# Patient Record
Sex: MALE | Race: WHITE | NOT HISPANIC OR LATINO | Employment: FULL TIME | URBAN - METROPOLITAN AREA
[De-identification: names, ages, dates, MRNs, and addresses within clinical notes are randomized per-mention and may not be internally consistent; named-entity substitution may affect disease eponyms.]

---

## 2017-02-23 ENCOUNTER — LAB (OUTPATIENT)
Dept: LAB | Facility: HOSPITAL | Age: 57
End: 2017-02-23

## 2017-02-23 DIAGNOSIS — D47.2 MONOCLONAL GAMMOPATHIES: ICD-10-CM

## 2017-02-23 DIAGNOSIS — M42.00 SCHEUERMANN DISEASE: ICD-10-CM

## 2017-02-23 LAB
ALBUMIN SERPL-MCNC: 4.1 G/DL (ref 3.5–5.2)
ALBUMIN/GLOB SERPL: 1.3 G/DL (ref 1.1–2.4)
ALP SERPL-CCNC: 38 U/L (ref 38–116)
ALT SERPL W P-5'-P-CCNC: 14 U/L (ref 0–41)
ANION GAP SERPL CALCULATED.3IONS-SCNC: 10.8 MMOL/L
AST SERPL-CCNC: 19 U/L (ref 0–40)
BASOPHILS # BLD AUTO: 0.06 10*3/MM3 (ref 0–0.1)
BASOPHILS NFR BLD AUTO: 1.2 % (ref 0–1.1)
BILIRUB SERPL-MCNC: 0.3 MG/DL (ref 0.1–1.2)
BUN BLD-MCNC: 11 MG/DL (ref 6–20)
BUN/CREAT SERPL: 12.1 (ref 7.3–30)
CALCIUM SPEC-SCNC: 10 MG/DL (ref 8.5–10.2)
CHLORIDE SERPL-SCNC: 101 MMOL/L (ref 98–107)
CO2 SERPL-SCNC: 26.2 MMOL/L (ref 22–29)
CREAT BLD-MCNC: 0.91 MG/DL (ref 0.7–1.3)
DEPRECATED RDW RBC AUTO: 44.9 FL (ref 37–49)
EOSINOPHIL # BLD AUTO: 0.65 10*3/MM3 (ref 0–0.36)
EOSINOPHIL NFR BLD AUTO: 13.1 % (ref 1–5)
ERYTHROCYTE [DISTWIDTH] IN BLOOD BY AUTOMATED COUNT: 13.2 % (ref 11.7–14.5)
GFR SERPL CREATININE-BSD FRML MDRD: 86 ML/MIN/1.73
GLOBULIN UR ELPH-MCNC: 3.1 GM/DL (ref 1.8–3.5)
GLUCOSE BLD-MCNC: 112 MG/DL (ref 74–124)
HCT VFR BLD AUTO: 38.9 % (ref 40–49)
HGB BLD-MCNC: 13 G/DL (ref 13.5–16.5)
IMM GRANULOCYTES # BLD: 0.01 10*3/MM3 (ref 0–0.03)
IMM GRANULOCYTES NFR BLD: 0.2 % (ref 0–0.5)
LYMPHOCYTES # BLD AUTO: 1.28 10*3/MM3 (ref 1–3.5)
LYMPHOCYTES NFR BLD AUTO: 25.9 % (ref 20–49)
MCH RBC QN AUTO: 31.1 PG (ref 27–33)
MCHC RBC AUTO-ENTMCNC: 33.4 G/DL (ref 32–35)
MCV RBC AUTO: 93.1 FL (ref 83–97)
MONOCYTES # BLD AUTO: 0.42 10*3/MM3 (ref 0.25–0.8)
MONOCYTES NFR BLD AUTO: 8.5 % (ref 4–12)
NEUTROPHILS # BLD AUTO: 2.53 10*3/MM3 (ref 1.5–7)
NEUTROPHILS NFR BLD AUTO: 51.1 % (ref 39–75)
NRBC BLD MANUAL-RTO: 0 /100 WBC (ref 0–0)
PLATELET # BLD AUTO: 301 10*3/MM3 (ref 150–375)
PMV BLD AUTO: 8.9 FL (ref 8.9–12.1)
POTASSIUM BLD-SCNC: 4 MMOL/L (ref 3.5–4.7)
PROT SERPL-MCNC: 7.2 G/DL (ref 6.3–8)
RBC # BLD AUTO: 4.18 10*6/MM3 (ref 4.3–5.5)
SODIUM BLD-SCNC: 138 MMOL/L (ref 134–145)
WBC NRBC COR # BLD: 4.95 10*3/MM3 (ref 4–10)

## 2017-02-23 PROCEDURE — 36415 COLL VENOUS BLD VENIPUNCTURE: CPT | Performed by: INTERNAL MEDICINE

## 2017-02-23 PROCEDURE — 85025 COMPLETE CBC W/AUTO DIFF WBC: CPT | Performed by: INTERNAL MEDICINE

## 2017-02-23 PROCEDURE — 80053 COMPREHEN METABOLIC PANEL: CPT | Performed by: INTERNAL MEDICINE

## 2017-02-24 LAB
ALBUMIN SERPL-MCNC: 3.7 G/DL (ref 2.9–4.4)
ALBUMIN/GLOB SERPL: 1.1 {RATIO} (ref 0.7–1.7)
ALPHA1 GLOB FLD ELPH-MCNC: 0.2 G/DL (ref 0–0.4)
ALPHA2 GLOB SERPL ELPH-MCNC: 0.7 G/DL (ref 0.4–1)
B-GLOBULIN SERPL ELPH-MCNC: 2.1 G/DL (ref 0.7–1.3)
GAMMA GLOB SERPL ELPH-MCNC: 0.5 G/DL (ref 0.4–1.8)
GLOBULIN SER CALC-MCNC: 3.5 G/DL (ref 2.2–3.9)
IGA SERPL-MCNC: 80 MG/DL (ref 90–386)
IGG SERPL-MCNC: 1600 MG/DL (ref 700–1600)
IGM SERPL-MCNC: 38 MG/DL (ref 20–172)
INTERPRETATION SERPL IEP-IMP: ABNORMAL
KAPPA LC SERPL-MCNC: 13.36 MG/L (ref 3.3–19.4)
KAPPA LC/LAMBDA SER: 1.54 {RATIO} (ref 0.26–1.65)
LAMBDA LC FREE SERPL-MCNC: 8.7 MG/L (ref 5.71–26.3)
Lab: ABNORMAL
M-SPIKE: ABNORMAL G/DL
PROT SERPL-MCNC: 7.2 G/DL (ref 6–8.5)

## 2017-03-03 ENCOUNTER — APPOINTMENT (OUTPATIENT)
Dept: LAB | Facility: HOSPITAL | Age: 57
End: 2017-03-03

## 2017-03-03 ENCOUNTER — OFFICE VISIT (OUTPATIENT)
Dept: ONCOLOGY | Facility: CLINIC | Age: 57
End: 2017-03-03

## 2017-03-03 VITALS
TEMPERATURE: 98.9 F | BODY MASS INDEX: 24.52 KG/M2 | SYSTOLIC BLOOD PRESSURE: 110 MMHG | DIASTOLIC BLOOD PRESSURE: 76 MMHG | HEIGHT: 73 IN | HEART RATE: 60 BPM | WEIGHT: 185 LBS | RESPIRATION RATE: 16 BRPM | OXYGEN SATURATION: 99 %

## 2017-03-03 DIAGNOSIS — M42.00 SCHEUERMANN DISEASE: ICD-10-CM

## 2017-03-03 DIAGNOSIS — E83.52 HYPERCALCEMIA: ICD-10-CM

## 2017-03-03 DIAGNOSIS — E21.3 HYPERPARATHYROIDISM (HCC): Primary | ICD-10-CM

## 2017-03-03 DIAGNOSIS — D47.2 MONOCLONAL GAMMOPATHIES: ICD-10-CM

## 2017-03-03 PROCEDURE — G0463 HOSPITAL OUTPT CLINIC VISIT: HCPCS | Performed by: INTERNAL MEDICINE

## 2017-03-03 PROCEDURE — 99213 OFFICE O/P EST LOW 20 MIN: CPT | Performed by: INTERNAL MEDICINE

## 2017-03-03 NOTE — PROGRESS NOTES
Subjective    REASONS FOR FOLLOWUP:   1. Monoclonal gammopathy of undetermined significance with normal free light ratio.   2. Hypercalcemia related to hyperparathyroidism.   3. Multiple compression fractures at thoracic spine. Scheuermann's disease?   4. Iron deficiency due to frequent blood donations. Negative gastrointestinal workup.   5. Increased plasma cells in the marrow 5 to 10%.   6. Increase peripheral neuropathy (?) B12 deficiency.   7. Abdominal fat pad biopsy done in [place], negative for amyloid.         History of Present Illness  patient is a 56-year-old male with a monoclonal gammopathy of undetermined significance along with primary hyperparathyroidism and Scheuermann's disease which complicated the picture significantly.  We have been watching his paraprotein over the last 2 years and has been a slight upward trend but he remains asymptomatic.  He has no bone pain and his neuropathy is almost completely resolved and he feels good and exercises daily.  His paraprotein today is stable from July and   He continues with  chronic low back pain which he has had for many years but no signs of sciatica which is surprising considering the amount of degenerative disease he has in his lumbar spine.    PAST MEDICAL HISTORY: Significant only for asthma which he has had since childhood and for which he is on a steroid inhaler for the last 12 years. He has a history of jaundice at birth treated with an exchange transfusion possibly from an Rh incompatibility. He has a history in his right big toe and history of degenerative changes in the thoracic spine with wedge deformities on two mid thoracic vertebral bodies noted on a routine chest x-ray in 2005  .   SURGICAL HISTORY: Colonoscopies, EGD with dilatation of an esophageal stricture in 2009 and sinus surgery in 2005.     ONCOLOGIC/HEMATOLOGIC HISTORY: History from previous dates can be found in the separate document.   The patient was noted on blood work  as far back as 2005 to have mildly elevated calcium in the mid-10 range. The patient's calcium elevation persisted and this year went as high as 11 and this prompted further evaluation with an endocrinology visit. Dr. Aliyah Luevano is evaluating for a hyperparathyroidism but in the course of her workup ordered a serum protein electrophoresis which showed a biclonal gammopathy with two separate paraproteins one being 1 gm, the other being . 2 gm and immunofixation was recommended. The patient was then referred to us for evaluation. He denies bone pains or weight loss or other complaints but has been mildly anemic over the last six months without any obvious cause. Blood work in Dr. Rocael juarez's office did show an elevated serum iron of 177, mild elevation of his glucose of uncertain significance. He has had no fever, sweats or weight loss or hematuria.   Lab data done in our office on 09/19/2013 shows sedimentation rate of 4, normal MMA, se rum iron 154, TIBC 435, and ferritin of 15. Normal B12 and folate. Beta 2 microglobulin normal. Calcium 10.8, SPEP confirms 1.2. Paraprotein 1 is 0.2 and other is 1.2 gm; both of them apparently IgG kappa. Serum free light chains normal.   Bone density shows mild osteopenia in spine and femoral neck. Ultrasound of kidneys is unremarkable. MRI of thoracic spine shows multiple Schmorl' s nodes indenting end plates of T5 to T11 with some anterior wedging of T7-8, T8-9. This was felt to be a form of Scheuermann's disease involving thoracic spine. No signs or symptoms of myelomatous or neoplastic involvement.   Bone marrow aspirate and biopsy done to exclude a plasma cell dyscrasia although hypercalcemia appears to be related to hyperparathyroidism and bone lesions may be incidental also.   Bone marrow biopsy shows 5 to 8% plasma cells. There were not enough plasma cells to do FISH test. Cytogenetics was normal. Musculoskeletal survey showed no lytic lesions in the skull or long bones.  Urine s roland showed no monoclonal protein in the urine in light of the normal free light chain ratio and at this point I cannot make a diagnosis for multiple myeloma. I plan to follow closely and do an MRI of the cervical and lumbar spine to make sure there are n o bony lesions detectable there. Discussed the fact that he may be a smoldering myeloma, rather than a true MGUS, but we will have to watch for the time being.   The patient was seen on 01/20/2014 with MRIs of the C-spine, lumbar spine and pelvis, all of wh ich showed degenerative disease and no lesions suspicious for myeloma. Pathology report on his parathyroidectomy shows 4 normal-appearing parathyroid with hypoplasia, and no tumor. Repeat calcium levels are pending. Urine protein studies show no monoclona l protein or proteinuria, and at this point he appears to have MGUS with mild increase in marrow plasmacytosis, and no evidence of bony involvement, and the hypercalcemia is from hyperparathyroidism, and we need to follow him closely. He still may be movin g to the [place] area and does not know for sure.   The patient was seen on 05/13/2014 with stable M protein and negative urine paraprotein. He has fairly recent onset of neuropathy in his legs, left more than right noted. We will check the B12 level and give him B12 injections empirically until the results are back and send him to a neurologist for evaluation. He may need a fat pad biopsy if there is no improvement of B12 to make sure he does not have amyloid neuropathy.   The patient was seen in [place] in July 2014, fat pad biopsy was negative for amyloid. Neuropathy better off the B6 and B12. MGUS stable. Followup continued with plans to repeat the skeletal survey and thoracic MRI annually.   Patient seen 01/09/2015 with leukopenia, possibly relate d to a viral infection which is resolving, complaining of neck pain. Cervical MRI to be reviewed. Skeletal survey shows no change, with  compression fractures in the mid-thoracic spine and no new lytic lesions. His SPEP shows a stable M protein, but hi s free light chain ratio is abnormal for the first time and needs to be watched closely.   Patient on 2015 with an MRI of the C-spine showing significant degenerative disease with no lesions of myeloma. His paraprotein is stable at 1.0 and his free l ight chain ratio has normalized. Continue followup with 2 month SPEPs and a urine SPEP in 2 months has been scheduled.   MRI of the spine in 10/16 shows no evidence of myeloma is to M proteins are 1.4 and 0.3 g/dl and urine protein shows us very small monoclonal protein-continued follow-up planned in the absence of anemia renal insufficiency or hypercalcemia     SOCIAL HISTORY: He is  and lives by himself. He does not smoke but drinks 50 beers a week and has been doing this for over 20 years. No risk factors for HIV.     FAMILY HISTORY: Father had melanoma at age 60, alive at 72. His mother is healthy. He has two brot hers who are healthy. He has a maternal grandmother with pancreatic cancer in her 50s and maternal grandfather who  of metastatic cancer in the abdomen in his 30s, possibly gastric cancer. Maternal uncle with acute leukemia who  recently. There is no family history of thyroid cancer or other endocrine malignancies or hypercalcemia. He has no children.     Review of Systems   Constitutional: Negative.  Negative for activity change, appetite change, chills, diaphoresis, fatigue and fever.   Musculoskeletal: Positive for back pain.      A comprehensive 14 point review of systems was performed and was negative except as mentioned.      Current Outpatient Prescriptions:   •  ADVAIR DISKUS 250-50 MCG/DOSE DISKUS, , Disp: , Rfl:   •  aspirin 81 MG chewable tablet, Chew 81 mg daily., Disp: , Rfl:   •  mometasone (NASONEX) 50 MCG/ACT nasal spray, 2 sprays into each nostril daily., Disp: , Rfl:       ALLERGIES:  No Known  "Allergies    Objective      Vitals:    03/03/17 0912   BP: 110/76   Pulse: 60   Resp: 16   Temp: 98.9 °F (37.2 °C)   TempSrc: Oral   SpO2: 99%   Weight: 185 lb (83.9 kg)   Height: 73.23\" (186 cm)  Comment: new ht.   PainSc: 0-No pain     Current Status 3/3/2017   ECOG score 0       Physical Exam    GENERAL:  Well-developed, well-nourished in no acute distress.   SKIN:  Warm, dry without rashes, purpura or petechiae.  HEAD:  Normocephalic.  EYES:  Pupils equal, round and reactive to light.  EOMs intact.  Conjunctivae normal.  EARS:  Hearing intact.  NOSE:  Septum midline.  No excoriations or nasal discharge.  MOUTH:  Tongue is well-papillated; no stomatitis or ulcers.  Lips normal.  THROAT:  Oropharynx without lesions or exudates.  NECK:  Supple with good range of motion; no thyromegaly or masses, no JVD.  LYMPHATICS:  No cervical, supraclavicular, axillary or inguinal adenopathy.  CHEST:  Lungs clear to percussion and auscultation. Good airflow.  CARDIAC:  Regular rate and rhythm without murmurs, rubs or gallops. Normal S1,S2.  ABDOMEN:  Soft, nontender with no organomegaly or masses.  EXTREMITIES:  No clubbing, cyanosis or edema.  NEUROLOGICAL:  Cranial Nerves II-XII grossly intact.  No focal neurological deficits.  PSYCHIATRIC:  Normal affect and mood.        RECENT LABS:  Hematology WBC   Date Value Ref Range Status   02/23/2017 4.95 4.00 - 10.00 10*3/mm3 Final     RBC   Date Value Ref Range Status   02/23/2017 4.18 (L) 4.30 - 5.50 10*6/mm3 Final     HEMOGLOBIN   Date Value Ref Range Status   02/23/2017 13.0 (L) 13.5 - 16.5 g/dL Final     HEMATOCRIT   Date Value Ref Range Status   02/23/2017 38.9 (L) 40.0 - 49.0 % Final     PLATELETS   Date Value Ref Range Status   02/23/2017 301 150 - 375 10*3/mm3 Final       M protein #1 -1.2 g   M protein #2 -0.2G in 2/17           Assessment/Plan   1.  MGUS with very slowly increasing paraprotein-no MRI evidence of myeloma in 10/16  2.  Primary hyperparathyroidism post " parathyroidectomy  3.  Scheuermann's disease  4.  Peripheral neuropathy resolved    Plan.  Repeat paraprotein studies in 2 months  See me in 4 months with repeat studies at that time-if his M protein is increasing we will do a skeletal survey and bone marrow                3/3/2017      CC:

## 2017-04-27 ENCOUNTER — LAB (OUTPATIENT)
Dept: LAB | Facility: HOSPITAL | Age: 57
End: 2017-04-27

## 2017-04-27 DIAGNOSIS — E83.52 HYPERCALCEMIA: ICD-10-CM

## 2017-04-27 DIAGNOSIS — D47.2 MONOCLONAL GAMMOPATHIES: ICD-10-CM

## 2017-04-27 DIAGNOSIS — M42.00 SCHEUERMANN DISEASE: ICD-10-CM

## 2017-04-27 DIAGNOSIS — E21.3 HYPERPARATHYROIDISM (HCC): ICD-10-CM

## 2017-04-27 LAB
ALBUMIN SERPL-MCNC: 4.4 G/DL (ref 3.5–5.2)
ALBUMIN/GLOB SERPL: 1.3 G/DL (ref 1.1–2.4)
ALP SERPL-CCNC: 34 U/L (ref 38–116)
ALT SERPL W P-5'-P-CCNC: 19 U/L (ref 0–41)
ANION GAP SERPL CALCULATED.3IONS-SCNC: 13.3 MMOL/L
AST SERPL-CCNC: 26 U/L (ref 0–40)
BASOPHILS # BLD AUTO: 0.06 10*3/MM3 (ref 0–0.1)
BASOPHILS NFR BLD AUTO: 1.3 % (ref 0–1.1)
BILIRUB SERPL-MCNC: 0.3 MG/DL (ref 0.1–1.2)
BUN BLD-MCNC: 14 MG/DL (ref 6–20)
BUN/CREAT SERPL: 14.4 (ref 7.3–30)
CALCIUM SPEC-SCNC: 10.1 MG/DL (ref 8.5–10.2)
CHLORIDE SERPL-SCNC: 101 MMOL/L (ref 98–107)
CO2 SERPL-SCNC: 24.7 MMOL/L (ref 22–29)
CREAT BLD-MCNC: 0.97 MG/DL (ref 0.7–1.3)
DEPRECATED RDW RBC AUTO: 44.2 FL (ref 37–49)
EOSINOPHIL # BLD AUTO: 0.16 10*3/MM3 (ref 0–0.36)
EOSINOPHIL NFR BLD AUTO: 3.5 % (ref 1–5)
ERYTHROCYTE [DISTWIDTH] IN BLOOD BY AUTOMATED COUNT: 13 % (ref 11.7–14.5)
GFR SERPL CREATININE-BSD FRML MDRD: 80 ML/MIN/1.73
GLOBULIN UR ELPH-MCNC: 3.4 GM/DL (ref 1.8–3.5)
GLUCOSE BLD-MCNC: 106 MG/DL (ref 74–124)
HCT VFR BLD AUTO: 39 % (ref 40–49)
HGB BLD-MCNC: 13.1 G/DL (ref 13.5–16.5)
IMM GRANULOCYTES # BLD: 0.01 10*3/MM3 (ref 0–0.03)
IMM GRANULOCYTES NFR BLD: 0.2 % (ref 0–0.5)
LYMPHOCYTES # BLD AUTO: 1.09 10*3/MM3 (ref 1–3.5)
LYMPHOCYTES NFR BLD AUTO: 23.9 % (ref 20–49)
MCH RBC QN AUTO: 31.2 PG (ref 27–33)
MCHC RBC AUTO-ENTMCNC: 33.6 G/DL (ref 32–35)
MCV RBC AUTO: 92.9 FL (ref 83–97)
MONOCYTES # BLD AUTO: 0.51 10*3/MM3 (ref 0.25–0.8)
MONOCYTES NFR BLD AUTO: 11.2 % (ref 4–12)
NEUTROPHILS # BLD AUTO: 2.73 10*3/MM3 (ref 1.5–7)
NEUTROPHILS NFR BLD AUTO: 59.9 % (ref 39–75)
NRBC BLD MANUAL-RTO: 0 /100 WBC (ref 0–0)
PLATELET # BLD AUTO: 297 10*3/MM3 (ref 150–375)
PMV BLD AUTO: 8.6 FL (ref 8.9–12.1)
POTASSIUM BLD-SCNC: 4.1 MMOL/L (ref 3.5–4.7)
PROT SERPL-MCNC: 7.8 G/DL (ref 6.3–8)
RBC # BLD AUTO: 4.2 10*6/MM3 (ref 4.3–5.5)
SODIUM BLD-SCNC: 139 MMOL/L (ref 134–145)
WBC NRBC COR # BLD: 4.56 10*3/MM3 (ref 4–10)

## 2017-04-27 PROCEDURE — 85025 COMPLETE CBC W/AUTO DIFF WBC: CPT

## 2017-04-27 PROCEDURE — 36415 COLL VENOUS BLD VENIPUNCTURE: CPT

## 2017-04-27 PROCEDURE — 80053 COMPREHEN METABOLIC PANEL: CPT

## 2017-04-28 LAB
ALBUMIN SERPL-MCNC: 4.1 G/DL (ref 2.9–4.4)
ALBUMIN/GLOB SERPL: 1.4 {RATIO} (ref 0.7–1.7)
ALPHA1 GLOB FLD ELPH-MCNC: 0.2 G/DL (ref 0–0.4)
ALPHA2 GLOB SERPL ELPH-MCNC: 0.4 G/DL (ref 0.4–1)
B-GLOBULIN SERPL ELPH-MCNC: 2 G/DL (ref 0.7–1.3)
GAMMA GLOB SERPL ELPH-MCNC: 0.4 G/DL (ref 0.4–1.8)
GLOBULIN SER CALC-MCNC: 3 G/DL (ref 2.2–3.9)
IGA SERPL-MCNC: 79 MG/DL (ref 90–386)
IGG SERPL-MCNC: 1751 MG/DL (ref 700–1600)
IGM SERPL-MCNC: 38 MG/DL (ref 20–172)
INTERPRETATION SERPL IEP-IMP: ABNORMAL
KAPPA LC SERPL-MCNC: 13.42 MG/L (ref 3.3–19.4)
KAPPA LC/LAMBDA SER: 1.44 {RATIO} (ref 0.26–1.65)
LAMBDA LC FREE SERPL-MCNC: 9.35 MG/L (ref 5.71–26.3)
Lab: ABNORMAL
M-SPIKE: ABNORMAL G/DL
PROT SERPL-MCNC: 7.1 G/DL (ref 6–8.5)

## 2017-06-08 ENCOUNTER — LAB (OUTPATIENT)
Dept: LAB | Facility: HOSPITAL | Age: 57
End: 2017-06-08

## 2017-06-08 DIAGNOSIS — E83.52 HYPERCALCEMIA: ICD-10-CM

## 2017-06-08 DIAGNOSIS — D47.2 MONOCLONAL GAMMOPATHIES: ICD-10-CM

## 2017-06-08 DIAGNOSIS — E21.3 HYPERPARATHYROIDISM (HCC): ICD-10-CM

## 2017-06-08 DIAGNOSIS — M42.00 SCHEUERMANN DISEASE: Primary | ICD-10-CM

## 2017-06-08 LAB
ALBUMIN SERPL-MCNC: 4.3 G/DL (ref 3.5–5.2)
ALBUMIN/GLOB SERPL: 1.3 G/DL (ref 1.1–2.4)
ALP SERPL-CCNC: 31 U/L (ref 38–116)
ALT SERPL W P-5'-P-CCNC: 18 U/L (ref 0–41)
ANION GAP SERPL CALCULATED.3IONS-SCNC: 16.7 MMOL/L
AST SERPL-CCNC: 23 U/L (ref 0–40)
BASOPHILS # BLD AUTO: 0.05 10*3/MM3 (ref 0–0.1)
BASOPHILS NFR BLD AUTO: 1.2 % (ref 0–1.1)
BILIRUB SERPL-MCNC: 0.4 MG/DL (ref 0.1–1.2)
BUN BLD-MCNC: 17 MG/DL (ref 6–20)
BUN/CREAT SERPL: 16.8 (ref 7.3–30)
CALCIUM SPEC-SCNC: 10 MG/DL (ref 8.5–10.2)
CHLORIDE SERPL-SCNC: 99 MMOL/L (ref 98–107)
CO2 SERPL-SCNC: 23.3 MMOL/L (ref 22–29)
CREAT BLD-MCNC: 1.01 MG/DL (ref 0.7–1.3)
DEPRECATED RDW RBC AUTO: 44.9 FL (ref 37–49)
EOSINOPHIL # BLD AUTO: 0.09 10*3/MM3 (ref 0–0.36)
EOSINOPHIL NFR BLD AUTO: 2.1 % (ref 1–5)
ERYTHROCYTE [DISTWIDTH] IN BLOOD BY AUTOMATED COUNT: 13.4 % (ref 11.7–14.5)
GFR SERPL CREATININE-BSD FRML MDRD: 77 ML/MIN/1.73
GLOBULIN UR ELPH-MCNC: 3.3 GM/DL (ref 1.8–3.5)
GLUCOSE BLD-MCNC: 94 MG/DL (ref 74–124)
HCT VFR BLD AUTO: 37.7 % (ref 40–49)
HGB BLD-MCNC: 13 G/DL (ref 13.5–16.5)
IMM GRANULOCYTES # BLD: 0.01 10*3/MM3 (ref 0–0.03)
IMM GRANULOCYTES NFR BLD: 0.2 % (ref 0–0.5)
LDH SERPL-CCNC: 190 U/L (ref 99–259)
LYMPHOCYTES # BLD AUTO: 0.94 10*3/MM3 (ref 1–3.5)
LYMPHOCYTES NFR BLD AUTO: 21.9 % (ref 20–49)
MCH RBC QN AUTO: 31.4 PG (ref 27–33)
MCHC RBC AUTO-ENTMCNC: 34.5 G/DL (ref 32–35)
MCV RBC AUTO: 91.1 FL (ref 83–97)
MONOCYTES # BLD AUTO: 0.42 10*3/MM3 (ref 0.25–0.8)
MONOCYTES NFR BLD AUTO: 9.8 % (ref 4–12)
NEUTROPHILS # BLD AUTO: 2.78 10*3/MM3 (ref 1.5–7)
NEUTROPHILS NFR BLD AUTO: 64.8 % (ref 39–75)
NRBC BLD MANUAL-RTO: 0 /100 WBC (ref 0–0)
PLATELET # BLD AUTO: 276 10*3/MM3 (ref 150–375)
PMV BLD AUTO: 8.6 FL (ref 8.9–12.1)
POTASSIUM BLD-SCNC: 4.1 MMOL/L (ref 3.5–4.7)
PROT SERPL-MCNC: 7.6 G/DL (ref 6.3–8)
RBC # BLD AUTO: 4.14 10*6/MM3 (ref 4.3–5.5)
SODIUM BLD-SCNC: 139 MMOL/L (ref 134–145)
URATE SERPL-MCNC: 5.8 MG/DL (ref 2.8–7.4)
WBC NRBC COR # BLD: 4.29 10*3/MM3 (ref 4–10)

## 2017-06-08 PROCEDURE — 85025 COMPLETE CBC W/AUTO DIFF WBC: CPT

## 2017-06-08 PROCEDURE — 80053 COMPREHEN METABOLIC PANEL: CPT

## 2017-06-08 PROCEDURE — 83615 LACTATE (LD) (LDH) ENZYME: CPT

## 2017-06-08 PROCEDURE — 84550 ASSAY OF BLOOD/URIC ACID: CPT

## 2017-06-08 PROCEDURE — 36415 COLL VENOUS BLD VENIPUNCTURE: CPT

## 2017-06-09 LAB
ALBUMIN SERPL-MCNC: 4.1 G/DL (ref 2.9–4.4)
ALBUMIN/GLOB SERPL: 1.4 {RATIO} (ref 0.7–1.7)
ALPHA1 GLOB FLD ELPH-MCNC: 0.2 G/DL (ref 0–0.4)
ALPHA2 GLOB SERPL ELPH-MCNC: 0.5 G/DL (ref 0.4–1)
B-GLOBULIN SERPL ELPH-MCNC: 0.7 G/DL (ref 0.7–1.3)
GAMMA GLOB SERPL ELPH-MCNC: 1.7 G/DL (ref 0.4–1.8)
GLOBULIN SER CALC-MCNC: 3.1 G/DL (ref 2.2–3.9)
IGA SERPL-MCNC: 75 MG/DL (ref 90–386)
IGG SERPL-MCNC: 1817 MG/DL (ref 700–1600)
IGM SERPL-MCNC: 39 MG/DL (ref 20–172)
INTERPRETATION SERPL IEP-IMP: ABNORMAL
KAPPA LC SERPL-MCNC: 11 MG/L (ref 3.3–19.4)
KAPPA LC/LAMBDA SER: 1.18 {RATIO} (ref 0.26–1.65)
LAMBDA LC FREE SERPL-MCNC: 9.3 MG/L (ref 5.7–26.3)
Lab: ABNORMAL
M-SPIKE: ABNORMAL G/DL
PROT SERPL-MCNC: 7.2 G/DL (ref 6–8.5)

## 2017-06-13 ENCOUNTER — APPOINTMENT (OUTPATIENT)
Dept: LAB | Facility: HOSPITAL | Age: 57
End: 2017-06-13

## 2017-06-13 ENCOUNTER — OFFICE VISIT (OUTPATIENT)
Dept: ONCOLOGY | Facility: CLINIC | Age: 57
End: 2017-06-13

## 2017-06-13 VITALS
DIASTOLIC BLOOD PRESSURE: 70 MMHG | WEIGHT: 176.8 LBS | TEMPERATURE: 98.6 F | SYSTOLIC BLOOD PRESSURE: 110 MMHG | BODY MASS INDEX: 23.43 KG/M2 | HEIGHT: 73 IN | HEART RATE: 66 BPM | RESPIRATION RATE: 16 BRPM

## 2017-06-13 DIAGNOSIS — E21.3 HYPERPARATHYROIDISM (HCC): ICD-10-CM

## 2017-06-13 DIAGNOSIS — D47.2 MONOCLONAL GAMMOPATHIES: Primary | ICD-10-CM

## 2017-06-13 DIAGNOSIS — E83.52 HYPERCALCEMIA: ICD-10-CM

## 2017-06-13 DIAGNOSIS — M42.00 SCHEUERMANN DISEASE: ICD-10-CM

## 2017-06-13 PROCEDURE — 99213 OFFICE O/P EST LOW 20 MIN: CPT | Performed by: INTERNAL MEDICINE

## 2017-06-13 PROCEDURE — G0463 HOSPITAL OUTPT CLINIC VISIT: HCPCS | Performed by: INTERNAL MEDICINE

## 2017-06-13 NOTE — PROGRESS NOTES
Subjective    REASONS FOR FOLLOWUP:   1. Monoclonal gammopathy of undetermined significance with normal free light ratio.   2. Hypercalcemia related to hyperparathyroidism.   3. Multiple compression fractures at thoracic spine. Scheuermann's disease?   4. Iron deficiency due to frequent blood donations. Negative gastrointestinal workup.   5. Increased plasma cells in the marrow 5 to 10%.   6. Increase peripheral neuropathy (?) B12 deficiency.   7. Abdominal fat pad biopsy done in [place], negative for amyloid.         History of Present Illness  patient is a 56-year-old male with a monoclonal gammopathy of undetermined significance along with primary hyperparathyroidism and Scheuermann's disease which complicated the picture significantly.  We have been watching his paraprotein over the last 2 years and it has remained very stable and  he remains asymptomatic.  He has no bone pain and his neuropathy is almost completely resolved and he feels good and exercises daily.  His paraprotein today is slightly lower than it has been in the last 6 months.   He continues with  chronic low back pain which he has had for many years but no signs of sciatica which is surprising considering the amount of degenerative disease he has in his lumbar spine.  An MRIs of the lumbar spine done last October were remarkable only for osteoarthritis and no myelomatous lesions-    PAST MEDICAL HISTORY: Significant only for asthma which he has had since childhood and for which he is on a steroid inhaler for the last 12 years. He has a history of jaundice at birth treated with an exchange transfusion possibly from an Rh incompatibility. He has a history in his right big toe and history of degenerative changes in the thoracic spine with wedge deformities on two mid thoracic vertebral bodies noted on a routine chest x-ray in 2005  .   SURGICAL HISTORY: Colonoscopies, EGD with dilatation of an esophageal stricture in 2009 and sinus surgery in  2005.     ONCOLOGIC/HEMATOLOGIC HISTORY: History from previous dates can be found in the separate document.   The patient was noted on blood work as far back as 2005 to have mildly elevated calcium in the mid-10 range. The patient's calcium elevation persisted and this year went as high as 11 and this prompted further evaluation with an endocrinology visit. Dr. Aliyah Luevano is evaluating for a hyperparathyroidism but in the course of her workup ordered a serum protein electrophoresis which showed a biclonal gammopathy with two separate paraproteins one being 1 gm, the other being . 2 gm and immunofixation was recommended. The patient was then referred to us for evaluation. He denies bone pains or weight loss or other complaints but has been mildly anemic over the last six months without any obvious cause. Blood work in Dr. Rocael juarez's office did show an elevated serum iron of 177, mild elevation of his glucose of uncertain significance. He has had no fever, sweats or weight loss or hematuria.   Lab data done in our office on 09/19/2013 shows sedimentation rate of 4, normal MMA, se rum iron 154, TIBC 435, and ferritin of 15. Normal B12 and folate. Beta 2 microglobulin normal. Calcium 10.8, SPEP confirms 1.2. Paraprotein 1 is 0.2 and other is 1.2 gm; both of them apparently IgG kappa. Serum free light chains normal.   Bone density shows mild osteopenia in spine and femoral neck. Ultrasound of kidneys is unremarkable. MRI of thoracic spine shows multiple Schmorl' s nodes indenting end plates of T5 to T11 with some anterior wedging of T7-8, T8-9. This was felt to be a form of Scheuermann's disease involving thoracic spine. No signs or symptoms of myelomatous or neoplastic involvement.   Bone marrow aspirate and biopsy done to exclude a plasma cell dyscrasia although hypercalcemia appears to be related to hyperparathyroidism and bone lesions may be incidental also.   Bone marrow biopsy shows 5 to 8% plasma cells. There  were not enough plasma cells to do FISH test. Cytogenetics was normal. Musculoskeletal survey showed no lytic lesions in the skull or long bones. Urine s tudy showed no monoclonal protein in the urine in light of the normal free light chain ratio and at this point I cannot make a diagnosis for multiple myeloma. I plan to follow closely and do an MRI of the cervical and lumbar spine to make sure there are n o bony lesions detectable there. Discussed the fact that he may be a smoldering myeloma, rather than a true MGUS, but we will have to watch for the time being.   The patient was seen on 01/20/2014 with MRIs of the C-spine, lumbar spine and pelvis, all of wh ich showed degenerative disease and no lesions suspicious for myeloma. Pathology report on his parathyroidectomy shows 4 normal-appearing parathyroid with hypoplasia, and no tumor. Repeat calcium levels are pending. Urine protein studies show no monoclona l protein or proteinuria, and at this point he appears to have MGUS with mild increase in marrow plasmacytosis, and no evidence of bony involvement, and the hypercalcemia is from hyperparathyroidism, and we need to follow him closely. He still may be movin g to the [place] area and does not know for sure.   The patient was seen on 05/13/2014 with stable M protein and negative urine paraprotein. He has fairly recent onset of neuropathy in his legs, left more than right noted. We will check the B12 level and give him B12 injections empirically until the results are back and send him to a neurologist for evaluation. He may need a fat pad biopsy if there is no improvement of B12 to make sure he does not have amyloid neuropathy.   The patient was seen in [place] in July 2014, fat pad biopsy was negative for amyloid. Neuropathy better off the B6 and B12. MGUS stable. Followup continued with plans to repeat the skeletal survey and thoracic MRI annually.   Patient seen 01/09/2015 with leukopenia, possibly relate  d to a viral infection which is resolving, complaining of neck pain. Cervical MRI to be reviewed. Skeletal survey shows no change, with compression fractures in the mid-thoracic spine and no new lytic lesions. His SPEP shows a stable M protein, but hi s free light chain ratio is abnormal for the first time and needs to be watched closely.   Patient on 2015 with an MRI of the C-spine showing significant degenerative disease with no lesions of myeloma. His paraprotein is stable at 1.0 and his free l ight chain ratio has normalized. Continue followup with 2 month SPEPs and a urine SPEP in 2 months has been scheduled.   MRI of the spine in 10/16 shows no evidence of myeloma is to M proteins are 1.4 and 0.3 g/dl and urine protein shows us very small monoclonal protein-continued follow-up planned in the absence of anemia renal insufficiency or hypercalcemia     SOCIAL HISTORY: He is  and lives by himself. He does not smoke but drinks 50 beers a week and has been doing this for over 20 years. No risk factors for HIV.     FAMILY HISTORY: Father had melanoma at age 60, alive at 72. His mother is healthy. He has two brot hers who are healthy. He has a maternal grandmother with pancreatic cancer in her 50s and maternal grandfather who  of metastatic cancer in the abdomen in his 30s, possibly gastric cancer. Maternal uncle with acute leukemia who  recently. There is no family history of thyroid cancer or other endocrine malignancies or hypercalcemia. He has no children.     Review of Systems   Constitutional: Negative.  Negative for activity change, appetite change, chills, diaphoresis, fatigue and fever.   Musculoskeletal: Positive for back pain.      A comprehensive 14 point review of systems was performed and was negative except as mentioned.      Current Outpatient Prescriptions:   •  ADVAIR DISKUS 250-50 MCG/DOSE DISKUS, , Disp: , Rfl:   •  aspirin 81 MG chewable tablet, Chew 81 mg daily., Disp: ,  "Rfl:   •  mometasone (NASONEX) 50 MCG/ACT nasal spray, 2 sprays into each nostril daily., Disp: , Rfl:       ALLERGIES:  No Known Allergies    Objective      Vitals:    06/13/17 1146   BP: 110/70   Pulse: 66   Resp: 16   Temp: 98.6 °F (37 °C)   Weight: 176 lb 12.8 oz (80.2 kg)   Height: 73.23\" (186 cm)   PainSc: 0-No pain     Current Status 6/13/2017   ECOG score 0       Physical Exam    GENERAL:  Well-developed, well-nourished in no acute distress.   SKIN:  Warm, dry without rashes, purpura or petechiae.  HEAD:  Normocephalic.  EYES:  Pupils equal, round and reactive to light.  EOMs intact.  Conjunctivae normal.  EARS:  Hearing intact.  NOSE:  Septum midline.  No excoriations or nasal discharge.  MOUTH:  Tongue is well-papillated; no stomatitis or ulcers.  Lips normal.  THROAT:  Oropharynx without lesions or exudates.  NECK:  Supple with good range of motion; no thyromegaly or masses, no JVD.  LYMPHATICS:  No cervical, supraclavicular, axillary or inguinal adenopathy.  CHEST:  Lungs clear to percussion and auscultation. Good airflow.  CARDIAC:  Regular rate and rhythm without murmurs, rubs or gallops. Normal S1,S2.  ABDOMEN:  Soft, nontender with no organomegaly or masses.  EXTREMITIES:  No clubbing, cyanosis or edema.  NEUROLOGICAL:  Cranial Nerves II-XII grossly intact.  No focal neurological deficits.  PSYCHIATRIC:  Normal affect and mood.        RECENT LABS:  Hematology WBC   Date Value Ref Range Status   06/08/2017 4.29 4.00 - 10.00 10*3/mm3 Final     RBC   Date Value Ref Range Status   06/08/2017 4.14 (L) 4.30 - 5.50 10*6/mm3 Final     Hemoglobin   Date Value Ref Range Status   06/08/2017 13.0 (L) 13.5 - 16.5 g/dL Final     Hematocrit   Date Value Ref Range Status   06/08/2017 37.7 (L) 40.0 - 49.0 % Final     Platelets   Date Value Ref Range Status   06/08/2017 276 150 - 375 10*3/mm3 Final       M protein #1 -1.0 g   M protein #2 -0.2G in 2/17           Assessment/Plan   1.  MGUS with very slowly increasing " paraprotein-no MRI evidence of myeloma in 10/16  2.  Primary hyperparathyroidism post parathyroidectomy  3.  Scheuermann's disease  4.  Peripheral neuropathy resolved    Plan.  Repeat paraprotein studies in 3months  See me in 6 months with repeat studies at that time-  Skeletal survey in October 6/13/2017      CC:

## 2017-09-05 ENCOUNTER — LAB (OUTPATIENT)
Dept: LAB | Facility: HOSPITAL | Age: 57
End: 2017-09-05

## 2017-09-05 DIAGNOSIS — E21.3 HYPERPARATHYROIDISM (HCC): ICD-10-CM

## 2017-09-05 DIAGNOSIS — M42.00 SCHEUERMANN DISEASE: ICD-10-CM

## 2017-09-05 DIAGNOSIS — D47.2 MONOCLONAL GAMMOPATHIES: ICD-10-CM

## 2017-09-05 DIAGNOSIS — E83.52 HYPERCALCEMIA: ICD-10-CM

## 2017-09-05 LAB
ALBUMIN SERPL-MCNC: 4.5 G/DL (ref 3.5–5.2)
ALBUMIN/GLOB SERPL: 1.4 G/DL (ref 1.1–2.4)
ALP SERPL-CCNC: 35 U/L (ref 38–116)
ALT SERPL W P-5'-P-CCNC: 18 U/L (ref 0–41)
ANION GAP SERPL CALCULATED.3IONS-SCNC: 13.5 MMOL/L
AST SERPL-CCNC: 24 U/L (ref 0–40)
BASOPHILS # BLD AUTO: 0.05 10*3/MM3 (ref 0–0.1)
BASOPHILS NFR BLD AUTO: 1 % (ref 0–1.1)
BILIRUB SERPL-MCNC: 0.6 MG/DL (ref 0.1–1.2)
BUN BLD-MCNC: 20 MG/DL (ref 6–20)
BUN/CREAT SERPL: 23.3 (ref 7.3–30)
CALCIUM SPEC-SCNC: 10.3 MG/DL (ref 8.5–10.2)
CHLORIDE SERPL-SCNC: 99 MMOL/L (ref 98–107)
CO2 SERPL-SCNC: 24.5 MMOL/L (ref 22–29)
CREAT BLD-MCNC: 0.86 MG/DL (ref 0.7–1.3)
DEPRECATED RDW RBC AUTO: 45.1 FL (ref 37–49)
EOSINOPHIL # BLD AUTO: 0.19 10*3/MM3 (ref 0–0.36)
EOSINOPHIL NFR BLD AUTO: 3.7 % (ref 1–5)
ERYTHROCYTE [DISTWIDTH] IN BLOOD BY AUTOMATED COUNT: 13.2 % (ref 11.7–14.5)
GFR SERPL CREATININE-BSD FRML MDRD: 92 ML/MIN/1.73
GLOBULIN UR ELPH-MCNC: 3.3 GM/DL (ref 1.8–3.5)
GLUCOSE BLD-MCNC: 102 MG/DL (ref 74–124)
HCT VFR BLD AUTO: 39 % (ref 40–49)
HGB BLD-MCNC: 13.3 G/DL (ref 13.5–16.5)
IMM GRANULOCYTES # BLD: 0.01 10*3/MM3 (ref 0–0.03)
IMM GRANULOCYTES NFR BLD: 0.2 % (ref 0–0.5)
LYMPHOCYTES # BLD AUTO: 1.09 10*3/MM3 (ref 1–3.5)
LYMPHOCYTES NFR BLD AUTO: 21.4 % (ref 20–49)
MCH RBC QN AUTO: 31.7 PG (ref 27–33)
MCHC RBC AUTO-ENTMCNC: 34.1 G/DL (ref 32–35)
MCV RBC AUTO: 93.1 FL (ref 83–97)
MONOCYTES # BLD AUTO: 0.47 10*3/MM3 (ref 0.25–0.8)
MONOCYTES NFR BLD AUTO: 9.2 % (ref 4–12)
NEUTROPHILS # BLD AUTO: 3.28 10*3/MM3 (ref 1.5–7)
NEUTROPHILS NFR BLD AUTO: 64.5 % (ref 39–75)
NRBC BLD MANUAL-RTO: 0 /100 WBC (ref 0–0)
PLATELET # BLD AUTO: 286 10*3/MM3 (ref 150–375)
PMV BLD AUTO: 8.7 FL (ref 8.9–12.1)
POTASSIUM BLD-SCNC: 4 MMOL/L (ref 3.5–4.7)
PROT SERPL-MCNC: 7.8 G/DL (ref 6.3–8)
RBC # BLD AUTO: 4.19 10*6/MM3 (ref 4.3–5.5)
SODIUM BLD-SCNC: 137 MMOL/L (ref 134–145)
WBC NRBC COR # BLD: 5.09 10*3/MM3 (ref 4–10)

## 2017-09-05 PROCEDURE — 85025 COMPLETE CBC W/AUTO DIFF WBC: CPT | Performed by: INTERNAL MEDICINE

## 2017-09-05 PROCEDURE — 80053 COMPREHEN METABOLIC PANEL: CPT | Performed by: INTERNAL MEDICINE

## 2017-09-05 PROCEDURE — 36415 COLL VENOUS BLD VENIPUNCTURE: CPT | Performed by: INTERNAL MEDICINE

## 2017-09-06 LAB
ALBUMIN 24H MFR UR ELPH: 23.1 %
ALBUMIN SERPL-MCNC: 4.3 G/DL (ref 2.9–4.4)
ALBUMIN/GLOB SERPL: 1.4 {RATIO} (ref 0.7–1.7)
ALPHA1 GLOB 24H MFR UR ELPH: 1.8 %
ALPHA1 GLOB FLD ELPH-MCNC: 0.2 G/DL (ref 0–0.4)
ALPHA2 GLOB 24H MFR UR ELPH: 12 %
ALPHA2 GLOB SERPL ELPH-MCNC: 0.5 G/DL (ref 0.4–1)
B-GLOBULIN MFR UR ELPH: 17 %
B-GLOBULIN SERPL ELPH-MCNC: 0.8 G/DL (ref 0.7–1.3)
GAMMA GLOB 24H MFR UR ELPH: 46.2 %
GAMMA GLOB SERPL ELPH-MCNC: 1.7 G/DL (ref 0.4–1.8)
GLOBULIN SER CALC-MCNC: 3.1 G/DL (ref 2.2–3.9)
HIV 1 & 2 AB SER-IMP: ABNORMAL
IGA SERPL-MCNC: 87 MG/DL (ref 90–386)
IGG SERPL-MCNC: 1882 MG/DL (ref 700–1600)
IGM SERPL-MCNC: 42 MG/DL (ref 20–172)
INTERPRETATION SERPL IEP-IMP: ABNORMAL
INTERPRETATION UR IFE-IMP: ABNORMAL
KAPPA LC SERPL-MCNC: 11.9 MG/L (ref 3.3–19.4)
KAPPA LC/LAMBDA SER: 1.2 {RATIO} (ref 0.26–1.65)
LAMBDA LC FREE SERPL-MCNC: 9.9 MG/L (ref 5.7–26.3)
Lab: ABNORMAL
M PROTEIN 24H MFR UR ELPH: ABNORMAL %
M-SPIKE: ABNORMAL G/DL
PROT 24H UR-MRATE: 242 MG/24 HR (ref 30–150)
PROT SERPL-MCNC: 7.4 G/DL (ref 6–8.5)
PROT UR-MCNC: 6.9 MG/DL

## 2017-10-17 ENCOUNTER — HOSPITAL ENCOUNTER (OUTPATIENT)
Dept: GENERAL RADIOLOGY | Facility: HOSPITAL | Age: 57
Discharge: HOME | End: 2017-10-17
Attending: INTERNAL MEDICINE | Admitting: INTERNAL MEDICINE

## 2017-10-17 DIAGNOSIS — E21.3 HYPERPARATHYROIDISM (HCC): ICD-10-CM

## 2017-10-17 DIAGNOSIS — E83.52 HYPERCALCEMIA: ICD-10-CM

## 2017-10-17 DIAGNOSIS — M42.00 SCHEUERMANN DISEASE: ICD-10-CM

## 2017-10-17 DIAGNOSIS — D47.2 MONOCLONAL GAMMOPATHIES: ICD-10-CM

## 2017-10-17 PROCEDURE — 77075 RADEX OSSEOUS SURVEY COMPL: CPT

## 2017-11-21 ENCOUNTER — LAB (OUTPATIENT)
Dept: LAB | Facility: HOSPITAL | Age: 57
End: 2017-11-21

## 2017-11-21 DIAGNOSIS — D47.2 MONOCLONAL GAMMOPATHIES: ICD-10-CM

## 2017-11-21 DIAGNOSIS — E21.3 HYPERPARATHYROIDISM (HCC): ICD-10-CM

## 2017-11-21 DIAGNOSIS — E83.52 HYPERCALCEMIA: ICD-10-CM

## 2017-11-21 DIAGNOSIS — M42.00 SCHEUERMANN DISEASE: ICD-10-CM

## 2017-11-21 LAB
BASOPHILS # BLD AUTO: 0.06 10*3/MM3 (ref 0–0.1)
BASOPHILS NFR BLD AUTO: 1 % (ref 0–1.1)
DEPRECATED RDW RBC AUTO: 45.7 FL (ref 37–49)
EOSINOPHIL # BLD AUTO: 0.11 10*3/MM3 (ref 0–0.36)
EOSINOPHIL NFR BLD AUTO: 1.8 % (ref 1–5)
ERYTHROCYTE [DISTWIDTH] IN BLOOD BY AUTOMATED COUNT: 13.3 % (ref 11.7–14.5)
HCT VFR BLD AUTO: 37.4 % (ref 40–49)
HGB BLD-MCNC: 12.8 G/DL (ref 13.5–16.5)
IMM GRANULOCYTES # BLD: 0.01 10*3/MM3 (ref 0–0.03)
IMM GRANULOCYTES NFR BLD: 0.2 % (ref 0–0.5)
LYMPHOCYTES # BLD AUTO: 1.02 10*3/MM3 (ref 1–3.5)
LYMPHOCYTES NFR BLD AUTO: 16.5 % (ref 20–49)
MCH RBC QN AUTO: 32.2 PG (ref 27–33)
MCHC RBC AUTO-ENTMCNC: 34.2 G/DL (ref 32–35)
MCV RBC AUTO: 94 FL (ref 83–97)
MONOCYTES # BLD AUTO: 0.56 10*3/MM3 (ref 0.25–0.8)
MONOCYTES NFR BLD AUTO: 9.1 % (ref 4–12)
NEUTROPHILS # BLD AUTO: 4.42 10*3/MM3 (ref 1.5–7)
NEUTROPHILS NFR BLD AUTO: 71.4 % (ref 39–75)
NRBC BLD MANUAL-RTO: 0 /100 WBC (ref 0–0)
PLATELET # BLD AUTO: 275 10*3/MM3 (ref 150–375)
PMV BLD AUTO: 8.8 FL (ref 8.9–12.1)
RBC # BLD AUTO: 3.98 10*6/MM3 (ref 4.3–5.5)
WBC NRBC COR # BLD: 6.18 10*3/MM3 (ref 4–10)

## 2017-11-21 PROCEDURE — 36415 COLL VENOUS BLD VENIPUNCTURE: CPT | Performed by: INTERNAL MEDICINE

## 2017-11-21 PROCEDURE — 85025 COMPLETE CBC W/AUTO DIFF WBC: CPT | Performed by: INTERNAL MEDICINE

## 2017-11-22 LAB
ALBUMIN SERPL-MCNC: 4 G/DL (ref 2.9–4.4)
ALBUMIN/GLOB SERPL: 1.4 {RATIO} (ref 0.7–1.7)
ALPHA1 GLOB FLD ELPH-MCNC: 0.2 G/DL (ref 0–0.4)
ALPHA2 GLOB SERPL ELPH-MCNC: 0.5 G/DL (ref 0.4–1)
B-GLOBULIN SERPL ELPH-MCNC: 0.8 G/DL (ref 0.7–1.3)
GAMMA GLOB SERPL ELPH-MCNC: 1.5 G/DL (ref 0.4–1.8)
GLOBULIN SER CALC-MCNC: 3 G/DL (ref 2.2–3.9)
IGA SERPL-MCNC: 79 MG/DL (ref 90–386)
IGG SERPL-MCNC: 1655 MG/DL (ref 700–1600)
IGM SERPL-MCNC: 35 MG/DL (ref 20–172)
INTERPRETATION SERPL IEP-IMP: ABNORMAL
KAPPA LC SERPL-MCNC: 10.6 MG/L (ref 3.3–19.4)
KAPPA LC/LAMBDA SER: 1.36 {RATIO} (ref 0.26–1.65)
LAMBDA LC FREE SERPL-MCNC: 7.8 MG/L (ref 5.7–26.3)
Lab: ABNORMAL
M-SPIKE: ABNORMAL G/DL
PROT SERPL-MCNC: 7 G/DL (ref 6–8.5)

## 2017-11-28 ENCOUNTER — OFFICE VISIT (OUTPATIENT)
Dept: ONCOLOGY | Facility: CLINIC | Age: 57
End: 2017-11-28

## 2017-11-28 ENCOUNTER — APPOINTMENT (OUTPATIENT)
Dept: LAB | Facility: HOSPITAL | Age: 57
End: 2017-11-28

## 2017-11-28 VITALS
HEIGHT: 74 IN | RESPIRATION RATE: 16 BRPM | OXYGEN SATURATION: 97 % | DIASTOLIC BLOOD PRESSURE: 70 MMHG | HEART RATE: 62 BPM | SYSTOLIC BLOOD PRESSURE: 110 MMHG | TEMPERATURE: 98.7 F | WEIGHT: 175.2 LBS | BODY MASS INDEX: 22.48 KG/M2

## 2017-11-28 DIAGNOSIS — D47.2 MONOCLONAL GAMMOPATHIES: ICD-10-CM

## 2017-11-28 DIAGNOSIS — E21.3 HYPERPARATHYROIDISM (HCC): ICD-10-CM

## 2017-11-28 DIAGNOSIS — E83.52 HYPERCALCEMIA: ICD-10-CM

## 2017-11-28 DIAGNOSIS — M42.00 SCHEUERMANN DISEASE: Primary | ICD-10-CM

## 2017-11-28 PROCEDURE — 99214 OFFICE O/P EST MOD 30 MIN: CPT | Performed by: INTERNAL MEDICINE

## 2017-11-28 PROCEDURE — G0463 HOSPITAL OUTPT CLINIC VISIT: HCPCS | Performed by: INTERNAL MEDICINE

## 2017-11-28 NOTE — PROGRESS NOTES
Subjective    REASONS FOR FOLLOWUP:   1. Monoclonal gammopathy of undetermined significance with normal free light ratio.   2. Hypercalcemia related to hyperparathyroidism.   3. Multiple compression fractures at thoracic spine. Scheuermann's disease?   4. Iron deficiency due to frequent blood donations. Negative gastrointestinal workup.   5. Increased plasma cells in the marrow 5 to 10%.   6. Increase peripheral neuropathy (?) B12 deficiency.   7. Abdominal fat pad biopsy done in [place], negative for amyloid.         History of Present Illness  patient is a 56-year-old male with a monoclonal gammopathy of undetermined significance along with primary hyperparathyroidism and Scheuermann's disease which complicated the picture significantly.  We have been watching his paraprotein over the last 3 years and it has remained very stable and  he remains asymptomatic.  He has no bone pain and his neuropathy is almost completely resolved and he feels good and exercises daily.  His paraprotein today is slightly lower than it has been in the last 6 months    His skeletal survey was reviewed and shows no new disease in the old fractures from his Scheuermann's disease  .   He continues with  chronic low back pain which he has had for many years but no signs of sciatica which is surprising considering the amount of degenerative disease he has in his lumbar spine.  The neuropathy that he had a year ago after his parathyroid surgery is completely resolved and he feels great  He is retired now and considering moving back to [place] where he is from originally and he will follow-up with Dr. Mercado who we saw there before for second opinion    PAST MEDICAL HISTORY: Significant only for asthma which he has had since childhood and for which he is on a steroid inhaler for the last 12 years. He has a history of jaundice at birth treated with an exchange transfusion possibly from an Rh incompatibility. He has a history in his right  big toe and history of degenerative changes in the thoracic spine with wedge deformities on two mid thoracic vertebral bodies noted on a routine chest x-ray in 2005  .   SURGICAL HISTORY: Colonoscopies, EGD with dilatation of an esophageal stricture in 2009 and sinus surgery in 2005.     ONCOLOGIC/HEMATOLOGIC HISTORY: History from previous dates can be found in the separate document.   The patient was noted on blood work as far back as 2005 to have mildly elevated calcium in the mid-10 range. The patient's calcium elevation persisted and this year went as high as 11 and this prompted further evaluation with an endocrinology visit. Dr. Aliyah Luevano is evaluating for a hyperparathyroidism but in the course of her workup ordered a serum protein electrophoresis which showed a biclonal gammopathy with two separate paraproteins one being 1 gm, the other being . 2 gm and immunofixation was recommended. The patient was then referred to us for evaluation. He denies bone pains or weight loss or other complaints but has been mildly anemic over the last six months without any obvious cause. Blood work in Dr. Rocael juarez's office did show an elevated serum iron of 177, mild elevation of his glucose of uncertain significance. He has had no fever, sweats or weight loss or hematuria.   Lab data done in our office on 09/19/2013 shows sedimentation rate of 4, normal MMA, se rum iron 154, TIBC 435, and ferritin of 15. Normal B12 and folate. Beta 2 microglobulin normal. Calcium 10.8, SPEP confirms 1.2. Paraprotein 1 is 0.2 and other is 1.2 gm; both of them apparently IgG kappa. Serum free light chains normal.   Bone density shows mild osteopenia in spine and femoral neck. Ultrasound of kidneys is unremarkable. MRI of thoracic spine shows multiple Schmorl' s nodes indenting end plates of T5 to T11 with some anterior wedging of T7-8, T8-9. This was felt to be a form of Scheuermann's disease involving thoracic spine. No signs or symptoms of  myelomatous or neoplastic involvement.   Bone marrow aspirate and biopsy done to exclude a plasma cell dyscrasia although hypercalcemia appears to be related to hyperparathyroidism and bone lesions may be incidental also.   Bone marrow biopsy shows 5 to 8% plasma cells. There were not enough plasma cells to do FISH test. Cytogenetics was normal. Musculoskeletal survey showed no lytic lesions in the skull or long bones. Urine s tudy showed no monoclonal protein in the urine in light of the normal free light chain ratio and at this point I cannot make a diagnosis for multiple myeloma. I plan to follow closely and do an MRI of the cervical and lumbar spine to make sure there are n o bony lesions detectable there. Discussed the fact that he may be a smoldering myeloma, rather than a true MGUS, but we will have to watch for the time being.   The patient was seen on 01/20/2014 with MRIs of the C-spine, lumbar spine and pelvis, all of wh ich showed degenerative disease and no lesions suspicious for myeloma. Pathology report on his parathyroidectomy shows 4 normal-appearing parathyroid with hypoplasia, and no tumor. Repeat calcium levels are pending. Urine protein studies show no monoclona l protein or proteinuria, and at this point he appears to have MGUS with mild increase in marrow plasmacytosis, and no evidence of bony involvement, and the hypercalcemia is from hyperparathyroidism, and we need to follow him closely. He still may be movin g to the [place] area and does not know for sure.   The patient was seen on 05/13/2014 with stable M protein and negative urine paraprotein. He has fairly recent onset of neuropathy in his legs, left more than right noted. We will check the B12 level and give him B12 injections empirically until the results are back and send him to a neurologist for evaluation. He may need a fat pad biopsy if there is no improvement of B12 to make sure he does not have amyloid neuropathy.   The  patient was seen in [place] in 2014, fat pad biopsy was negative for amyloid. Neuropathy better off the B6 and B12. MGUS stable. Followup continued with plans to repeat the skeletal survey and thoracic MRI annually.   Patient seen 2015 with leukopenia, possibly relate d to a viral infection which is resolving, complaining of neck pain. Cervical MRI to be reviewed. Skeletal survey shows no change, with compression fractures in the mid-thoracic spine and no new lytic lesions. His SPEP shows a stable M protein, but hi s free light chain ratio is abnormal for the first time and needs to be watched closely.   Patient on 2015 with an MRI of the C-spine showing significant degenerative disease with no lesions of myeloma. His paraprotein is stable at 1.0 and his free l ight chain ratio has normalized. Continue followup with 2 month SPEPs and a urine SPEP in 2 months has been scheduled.   MRI of the spine in 10/16 shows no evidence of myeloma is to M proteins are 1.4 and 0.3 g/dl and urine protein shows us very small monoclonal protein-continued follow-up planned in the absence of anemia renal insufficiency or hypercalcemia     SOCIAL HISTORY: He is  and lives by himself. He does not smoke but drinks 50 beers a week and has been doing this for over 20 years. No risk factors for HIV.     FAMILY HISTORY: Father had melanoma at age 60, alive at 72. His mother is healthy. He has two brot hers who are healthy. He has a maternal grandmother with pancreatic cancer in her 50s and maternal grandfather who  of metastatic cancer in the abdomen in his 30s, possibly gastric cancer. Maternal uncle with acute leukemia who  recently. There is no family history of thyroid cancer or other endocrine malignancies or hypercalcemia. He has no children.     Review of Systems   Constitutional: Negative.  Negative for activity change, appetite change, chills, diaphoresis, fatigue and fever.   Musculoskeletal:  "Positive for back pain.      A comprehensive 14 point review of systems was performed and was negative except as mentioned.      Current Outpatient Prescriptions:   •  ADVAIR DISKUS 250-50 MCG/DOSE DISKUS, , Disp: , Rfl:   •  aspirin 81 MG chewable tablet, Chew 81 mg daily., Disp: , Rfl:   •  mometasone (NASONEX) 50 MCG/ACT nasal spray, 2 sprays into each nostril daily., Disp: , Rfl:       ALLERGIES:  No Known Allergies    Objective      Vitals:    11/28/17 1029   BP: 110/70   Pulse: 62   Resp: 16   Temp: 98.7 °F (37.1 °C)   TempSrc: Oral   SpO2: 97%   Weight: 175 lb 3.2 oz (79.5 kg)   Height: 73.7\" (187.2 cm)  Comment: new ht w/shoes   PainSc: 0-No pain     Current Status 11/28/2017   ECOG score 0       Physical Exam    GENERAL:  Well-developed, well-nourished in no acute distress.   SKIN:  Warm, dry without rashes, purpura or petechiae.  HEAD:  Normocephalic.  EYES:  Pupils equal, round and reactive to light.  EOMs intact.  Conjunctivae normal.  EARS:  Hearing intact.  NOSE:  Septum midline.  No excoriations or nasal discharge.  MOUTH:  Tongue is well-papillated; no stomatitis or ulcers.  Lips normal.  THROAT:  Oropharynx without lesions or exudates.  NECK:  Supple with good range of motion; no thyromegaly or masses, no JVD.  LYMPHATICS:  No cervical, supraclavicular, axillary or inguinal adenopathy.  CHEST:  Lungs clear to percussion and auscultation. Good airflow.  CARDIAC:  Regular rate and rhythm without murmurs, rubs or gallops. Normal S1,S2.  ABDOMEN:  Soft, nontender with no organomegaly or masses.  EXTREMITIES:  No clubbing, cyanosis or edema.  NEUROLOGICAL:  Cranial Nerves II-XII grossly intact.  No focal neurological deficits.  PSYCHIATRIC:  Normal affect and mood.        RECENT LABS:  Hematology WBC   Date Value Ref Range Status   11/21/2017 6.18 4.00 - 10.00 10*3/mm3 Final     RBC   Date Value Ref Range Status   11/21/2017 3.98 (L) 4.30 - 5.50 10*6/mm3 Final     Hemoglobin   Date Value Ref Range Status "   11/21/2017 12.8 (L) 13.5 - 16.5 g/dL Final     Hematocrit   Date Value Ref Range Status   11/21/2017 37.4 (L) 40.0 - 49.0 % Final     Platelets   Date Value Ref Range Status   11/21/2017 275 150 - 375 10*3/mm3 Final       M protein #1 -0.9 g   M protein #2 -0.2G      Skeletal survey  IMPRESSION:  1. No definite evidence of neoplasm.  2. Several old thoracic compression deformities.      This report was finalized on 10/19/2017 2:16 PM by Dr. Froylan Mendoza,    Assessment/Plan   1.  MGUS with very slowly increasing paraprotein-no MRI evidence of myeloma in 10/16  2.  Primary hyperparathyroidism post parathyroidectomy  3.  Scheuermann's disease  4.  Peripheral neuropathy resolved    Plan.  Repeat paraprotein studies in 3months  See me in 6 months with repeat studies at that time-  If he moved to [place] before then we will get him a follow-up with Dr. Mercado we saw before for second opinion              11/28/2017      CC:

## 2018-02-20 ENCOUNTER — LAB (OUTPATIENT)
Dept: LAB | Facility: HOSPITAL | Age: 58
End: 2018-02-20

## 2018-02-20 DIAGNOSIS — E83.52 HYPERCALCEMIA: ICD-10-CM

## 2018-02-20 DIAGNOSIS — E21.3 HYPERPARATHYROIDISM (HCC): ICD-10-CM

## 2018-02-20 DIAGNOSIS — D47.2 MONOCLONAL GAMMOPATHIES: ICD-10-CM

## 2018-02-20 DIAGNOSIS — M42.00 SCHEUERMANN DISEASE: ICD-10-CM

## 2018-02-20 LAB
B2 MICROGLOB SERPL-MCNC: 1.5 MG/L (ref 0.8–2.2)
BASOPHILS # BLD AUTO: 0.06 10*3/MM3 (ref 0–0.1)
BASOPHILS NFR BLD AUTO: 1.3 % (ref 0–1.1)
DEPRECATED RDW RBC AUTO: 44.6 FL (ref 37–49)
EOSINOPHIL # BLD AUTO: 0.17 10*3/MM3 (ref 0–0.36)
EOSINOPHIL NFR BLD AUTO: 3.8 % (ref 1–5)
ERYTHROCYTE [DISTWIDTH] IN BLOOD BY AUTOMATED COUNT: 13 % (ref 11.7–14.5)
HCT VFR BLD AUTO: 37.4 % (ref 40–49)
HGB BLD-MCNC: 12.6 G/DL (ref 13.5–16.5)
IMM GRANULOCYTES # BLD: 0.01 10*3/MM3 (ref 0–0.03)
IMM GRANULOCYTES NFR BLD: 0.2 % (ref 0–0.5)
LYMPHOCYTES # BLD AUTO: 1.1 10*3/MM3 (ref 1–3.5)
LYMPHOCYTES NFR BLD AUTO: 24.4 % (ref 20–49)
MCH RBC QN AUTO: 31.3 PG (ref 27–33)
MCHC RBC AUTO-ENTMCNC: 33.7 G/DL (ref 32–35)
MCV RBC AUTO: 93 FL (ref 83–97)
MONOCYTES # BLD AUTO: 0.49 10*3/MM3 (ref 0.25–0.8)
MONOCYTES NFR BLD AUTO: 10.9 % (ref 4–12)
NEUTROPHILS # BLD AUTO: 2.67 10*3/MM3 (ref 1.5–7)
NEUTROPHILS NFR BLD AUTO: 59.4 % (ref 39–75)
NRBC BLD MANUAL-RTO: 0 /100 WBC (ref 0–0)
PLATELET # BLD AUTO: 283 10*3/MM3 (ref 150–375)
PMV BLD AUTO: 8.6 FL (ref 8.9–12.1)
RBC # BLD AUTO: 4.02 10*6/MM3 (ref 4.3–5.5)
WBC NRBC COR # BLD: 4.5 10*3/MM3 (ref 4–10)

## 2018-02-20 PROCEDURE — 85025 COMPLETE CBC W/AUTO DIFF WBC: CPT | Performed by: INTERNAL MEDICINE

## 2018-02-20 PROCEDURE — 82232 ASSAY OF BETA-2 PROTEIN: CPT | Performed by: INTERNAL MEDICINE

## 2018-02-20 PROCEDURE — 36415 COLL VENOUS BLD VENIPUNCTURE: CPT | Performed by: INTERNAL MEDICINE

## 2018-02-21 LAB
ALBUMIN SERPL-MCNC: 4.2 G/DL (ref 2.9–4.4)
ALBUMIN/GLOB SERPL: 1.3 {RATIO} (ref 0.7–1.7)
ALPHA1 GLOB FLD ELPH-MCNC: 0.2 G/DL (ref 0–0.4)
ALPHA2 GLOB SERPL ELPH-MCNC: 0.5 G/DL (ref 0.4–1)
B-GLOBULIN SERPL ELPH-MCNC: 2.2 G/DL (ref 0.7–1.3)
GAMMA GLOB SERPL ELPH-MCNC: 0.5 G/DL (ref 0.4–1.8)
GLOBULIN SER CALC-MCNC: 3.4 G/L (ref 2.2–3.9)
IGA SERPL-MCNC: 80 MG/DL (ref 90–386)
IGG SERPL-MCNC: 1634 MG/DL (ref 700–1600)
IGM SERPL-MCNC: 38 MG/DL (ref 20–172)
INTERPRETATION SERPL IEP-IMP: ABNORMAL
KAPPA LC SERPL-MCNC: 10.6 MG/L (ref 3.3–19.4)
KAPPA LC/LAMBDA SER: 1.15 {RATIO} (ref 0.26–1.65)
LAMBDA LC FREE SERPL-MCNC: 9.2 MG/L (ref 5.7–26.3)
Lab: ABNORMAL
M-SPIKE: ABNORMAL G/DL
PROT SERPL-MCNC: 7.6 G/DL (ref 6–8.5)

## 2018-05-01 ENCOUNTER — LAB (OUTPATIENT)
Dept: LAB | Facility: HOSPITAL | Age: 58
End: 2018-05-01

## 2018-05-01 DIAGNOSIS — M42.00 SCHEUERMANN DISEASE: ICD-10-CM

## 2018-05-01 DIAGNOSIS — E83.52 HYPERCALCEMIA: ICD-10-CM

## 2018-05-01 DIAGNOSIS — D47.2 MONOCLONAL GAMMOPATHIES: ICD-10-CM

## 2018-05-01 DIAGNOSIS — E21.3 HYPERPARATHYROIDISM (HCC): ICD-10-CM

## 2018-05-01 LAB
BASOPHILS # BLD AUTO: 0.07 10*3/MM3 (ref 0–0.1)
BASOPHILS NFR BLD AUTO: 1 % (ref 0–1.1)
DEPRECATED RDW RBC AUTO: 46.1 FL (ref 37–49)
EOSINOPHIL # BLD AUTO: 0.23 10*3/MM3 (ref 0–0.36)
EOSINOPHIL NFR BLD AUTO: 3.3 % (ref 1–5)
ERYTHROCYTE [DISTWIDTH] IN BLOOD BY AUTOMATED COUNT: 13.2 % (ref 11.7–14.5)
HCT VFR BLD AUTO: 39 % (ref 40–49)
HGB BLD-MCNC: 12.9 G/DL (ref 13.5–16.5)
IMM GRANULOCYTES # BLD: 0.02 10*3/MM3 (ref 0–0.03)
IMM GRANULOCYTES NFR BLD: 0.3 % (ref 0–0.5)
LYMPHOCYTES # BLD AUTO: 0.97 10*3/MM3 (ref 1–3.5)
LYMPHOCYTES NFR BLD AUTO: 14 % (ref 20–49)
MCH RBC QN AUTO: 31.3 PG (ref 27–33)
MCHC RBC AUTO-ENTMCNC: 33.1 G/DL (ref 32–35)
MCV RBC AUTO: 94.7 FL (ref 83–97)
MONOCYTES # BLD AUTO: 0.58 10*3/MM3 (ref 0.25–0.8)
MONOCYTES NFR BLD AUTO: 8.4 % (ref 4–12)
NEUTROPHILS # BLD AUTO: 5.05 10*3/MM3 (ref 1.5–7)
NEUTROPHILS NFR BLD AUTO: 73 % (ref 39–75)
NRBC BLD MANUAL-RTO: 0 /100 WBC (ref 0–0)
PLATELET # BLD AUTO: 286 10*3/MM3 (ref 150–375)
PMV BLD AUTO: 8.7 FL (ref 8.9–12.1)
RBC # BLD AUTO: 4.12 10*6/MM3 (ref 4.3–5.5)
WBC NRBC COR # BLD: 6.92 10*3/MM3 (ref 4–10)

## 2018-05-01 PROCEDURE — 36415 COLL VENOUS BLD VENIPUNCTURE: CPT | Performed by: INTERNAL MEDICINE

## 2018-05-01 PROCEDURE — 85025 COMPLETE CBC W/AUTO DIFF WBC: CPT | Performed by: INTERNAL MEDICINE

## 2018-05-02 LAB
ALBUMIN SERPL-MCNC: 4 G/DL (ref 2.9–4.4)
ALBUMIN/GLOB SERPL: 1.3 {RATIO} (ref 0.7–1.7)
ALPHA1 GLOB FLD ELPH-MCNC: 0.2 G/DL (ref 0–0.4)
ALPHA2 GLOB SERPL ELPH-MCNC: 0.5 G/DL (ref 0.4–1)
B-GLOBULIN SERPL ELPH-MCNC: 2.2 G/DL (ref 0.7–1.3)
GAMMA GLOB SERPL ELPH-MCNC: 0.5 G/DL (ref 0.4–1.8)
GLOBULIN SER CALC-MCNC: 3.3 G/DL (ref 2.2–3.9)
IGA SERPL-MCNC: 81 MG/DL (ref 90–386)
IGG SERPL-MCNC: 1782 MG/DL (ref 700–1600)
IGM SERPL-MCNC: 41 MG/DL (ref 20–172)
INTERPRETATION SERPL IEP-IMP: ABNORMAL
KAPPA LC SERPL-MCNC: 12.1 MG/L (ref 3.3–19.4)
KAPPA LC/LAMBDA SER: 1.41 {RATIO} (ref 0.26–1.65)
LAMBDA LC FREE SERPL-MCNC: 8.6 MG/L (ref 5.7–26.3)
Lab: ABNORMAL
M-SPIKE: ABNORMAL G/DL
PROT SERPL-MCNC: 7.3 G/DL (ref 6–8.5)

## 2018-05-08 ENCOUNTER — APPOINTMENT (OUTPATIENT)
Dept: LAB | Facility: HOSPITAL | Age: 58
End: 2018-05-08

## 2018-05-08 ENCOUNTER — OFFICE VISIT (OUTPATIENT)
Dept: ONCOLOGY | Facility: CLINIC | Age: 58
End: 2018-05-08

## 2018-05-08 VITALS
TEMPERATURE: 98.5 F | WEIGHT: 174.8 LBS | HEART RATE: 63 BPM | HEIGHT: 74 IN | DIASTOLIC BLOOD PRESSURE: 60 MMHG | OXYGEN SATURATION: 96 % | RESPIRATION RATE: 16 BRPM | SYSTOLIC BLOOD PRESSURE: 108 MMHG | BODY MASS INDEX: 22.43 KG/M2

## 2018-05-08 DIAGNOSIS — M42.00 SCHEUERMANN DISEASE: Primary | ICD-10-CM

## 2018-05-08 DIAGNOSIS — E83.52 HYPERCALCEMIA: ICD-10-CM

## 2018-05-08 DIAGNOSIS — E21.3 HYPERPARATHYROIDISM (HCC): ICD-10-CM

## 2018-05-08 DIAGNOSIS — D47.2 MONOCLONAL GAMMOPATHIES: ICD-10-CM

## 2018-05-08 PROCEDURE — 99213 OFFICE O/P EST LOW 20 MIN: CPT | Performed by: INTERNAL MEDICINE

## 2018-05-08 PROCEDURE — G0463 HOSPITAL OUTPT CLINIC VISIT: HCPCS | Performed by: INTERNAL MEDICINE

## 2018-05-08 NOTE — PROGRESS NOTES
Subjective    REASONS FOR FOLLOWUP:   1. Monoclonal gammopathy of undetermined significance with normal free light ratio.   2. Hypercalcemia related to hyperparathyroidism.   3. Multiple compression fractures at thoracic spine. Scheuermann's disease?   4. Iron deficiency due to frequent blood donations. Negative gastrointestinal workup.   5. Increased plasma cells in the marrow 5 to 10%.   6.  peripheral neuropathy (?) B12 deficiency.  improved  7. Abdominal fat pad biopsy done in [place], negative for amyloid.         History of Present Illness  patient is a 56-year-old male with a monoclonal gammopathy of undetermined significance along with primary hyperparathyroidism and Scheuermann's disease which complicated the picture significantly.  We have been watching his paraprotein over the last 3.5 years and it has remained very stable and  he remains asymptomatic.  He has no bone pain and his neuropathy which almost completely resolved and is slowly recurring     His paraprotein today is slightlyhigher than it has been in the last 6 months but his free light chain ratio is still stable  His hemoglobin is a little low at 12.9 but stable.  He is retired and has been drinking a lot more bourbon and beer and I cautioned him against this as this may have aggravated his neuropathy in the past  .   He continues with  chronic low back pain which he has had for many years and probably from his Scheuermann's disease but no signs of sciatica which is surprising considering the amount of degenerative disease he has in his lumbar spine.    He is retired now and considering moving back to [place] where he is from originally and he will follow-up with Dr. Mercado who we saw there before for second opinion    PAST MEDICAL HISTORY: Significant only for asthma which he has had since childhood and for which he is on a steroid inhaler for the last 12 years. He has a history of jaundice at birth treated with an exchange transfusion  possibly from an Rh incompatibility. He has a history in his right big toe and history of degenerative changes in the thoracic spine with wedge deformities on two mid thoracic vertebral bodies noted on a routine chest x-ray in 2005  .   SURGICAL HISTORY: Colonoscopies, EGD with dilatation of an esophageal stricture in 2009 and sinus surgery in 2005.     ONCOLOGIC/HEMATOLOGIC HISTORY: History from previous dates can be found in the separate document.   The patient was noted on blood work as far back as 2005 to have mildly elevated calcium in the mid-10 range. The patient's calcium elevation persisted and this year went as high as 11 and this prompted further evaluation with an endocrinology visit. Dr. Aliyah Luevano is evaluating for a hyperparathyroidism but in the course of her workup ordered a serum protein electrophoresis which showed a biclonal gammopathy with two separate paraproteins one being 1 gm, the other being . 2 gm and immunofixation was recommended. The patient was then referred to us for evaluation. He denies bone pains or weight loss or other complaints but has been mildly anemic over the last six months without any obvious cause. Blood work in Dr. Rocael juarez's office did show an elevated serum iron of 177, mild elevation of his glucose of uncertain significance. He has had no fever, sweats or weight loss or hematuria.   Lab data done in our office on 09/19/2013 shows sedimentation rate of 4, normal MMA, se rum iron 154, TIBC 435, and ferritin of 15. Normal B12 and folate. Beta 2 microglobulin normal. Calcium 10.8, SPEP confirms 1.2. Paraprotein 1 is 0.2 and other is 1.2 gm; both of them apparently IgG kappa. Serum free light chains normal.   Bone density shows mild osteopenia in spine and femoral neck. Ultrasound of kidneys is unremarkable. MRI of thoracic spine shows multiple Schmorl' s nodes indenting end plates of T5 to T11 with some anterior wedging of T7-8, T8-9. This was felt to be a form of  Scheuermann's disease involving thoracic spine. No signs or symptoms of myelomatous or neoplastic involvement.   Bone marrow aspirate and biopsy done to exclude a plasma cell dyscrasia although hypercalcemia appears to be related to hyperparathyroidism and bone lesions may be incidental also.   Bone marrow biopsy shows 5 to 8% plasma cells. There were not enough plasma cells to do FISH test. Cytogenetics was normal. Musculoskeletal survey showed no lytic lesions in the skull or long bones. Urine s tudy showed no monoclonal protein in the urine in light of the normal free light chain ratio and at this point I cannot make a diagnosis for multiple myeloma. I plan to follow closely and do an MRI of the cervical and lumbar spine to make sure there are n o bony lesions detectable there. Discussed the fact that he may be a smoldering myeloma, rather than a true MGUS, but we will have to watch for the time being.   The patient was seen on 01/20/2014 with MRIs of the C-spine, lumbar spine and pelvis, all of wh ich showed degenerative disease and no lesions suspicious for myeloma. Pathology report on his parathyroidectomy shows 4 normal-appearing parathyroid with hypoplasia, and no tumor. Repeat calcium levels are pending. Urine protein studies show no monoclona l protein or proteinuria, and at this point he appears to have MGUS with mild increase in marrow plasmacytosis, and no evidence of bony involvement, and the hypercalcemia is from hyperparathyroidism, and we need to follow him closely. He still may be movin g to the [place] area and does not know for sure.   The patient was seen on 05/13/2014 with stable M protein and negative urine paraprotein. He has fairly recent onset of neuropathy in his legs, left more than right noted. We will check the B12 level and give him B12 injections empirically until the results are back and send him to a neurologist for evaluation. He may need a fat pad biopsy if there is no  improvement of B12 to make sure he does not have amyloid neuropathy.   The patient was seen in [place] in 2014, fat pad biopsy was negative for amyloid. Neuropathy better off the B6 and B12. MGUS stable. Followup continued with plans to repeat the skeletal survey and thoracic MRI annually.   Patient seen 2015 with leukopenia, possibly relate d to a viral infection which is resolving, complaining of neck pain. Cervical MRI to be reviewed. Skeletal survey shows no change, with compression fractures in the mid-thoracic spine and no new lytic lesions. His SPEP shows a stable M protein, but hi s free light chain ratio is abnormal for the first time and needs to be watched closely.   Patient on 2015 with an MRI of the C-spine showing significant degenerative disease with no lesions of myeloma. His paraprotein is stable at 1.0 and his free l ight chain ratio has normalized. Continue followup with 2 month SPEPs and a urine SPEP in 2 months has been scheduled.   MRI of the spine in 10/16 shows no evidence of myeloma is to M proteins are 1.4 and 0.3 g/dl and urine protein shows us very small monoclonal protein-continued follow-up planned in the absence of anemia renal insufficiency or hypercalcemia     SOCIAL HISTORY: He is  and lives by himself. He does not smoke but drinks 50 beers a week and has been doing this for over 20 years. No risk factors for HIV.     FAMILY HISTORY: Father had melanoma at age 60, alive at 72. His mother is healthy. He has two brot hers who are healthy. He has a maternal grandmother with pancreatic cancer in her 50s and maternal grandfather who  of metastatic cancer in the abdomen in his 30s, possibly gastric cancer. Maternal uncle with acute leukemia who  recently. There is no family history of thyroid cancer or other endocrine malignancies or hypercalcemia. He has no children.     Review of Systems   Constitutional: Negative.  Negative for activity change,  "appetite change, chills, diaphoresis, fatigue and fever.   Musculoskeletal: Positive for back pain.      A comprehensive 14 point review of systems was performed and was negative except as mentioned.      Current Outpatient Prescriptions:   •  ADVAIR DISKUS 250-50 MCG/DOSE DISKUS, , Disp: , Rfl:   •  aspirin 81 MG chewable tablet, Chew 81 mg daily., Disp: , Rfl:   •  mometasone (NASONEX) 50 MCG/ACT nasal spray, 2 sprays into each nostril daily., Disp: , Rfl:       ALLERGIES:  No Known Allergies    Objective      Vitals:    05/08/18 1022   BP: 108/60   Pulse: 63   Resp: 16   Temp: 98.5 °F (36.9 °C)   SpO2: 96%  Comment: at rest   Weight: 79.3 kg (174 lb 12.8 oz)   Height: 187 cm (73.62\")  Comment: new ht w shoes   PainSc: 0-No pain     Current Status 5/8/2018   ECOG score 0       Physical Exam    GENERAL:  Well-developed, well-nourished in no acute distress.   SKIN:  Warm, dry without rashes, purpura or petechiae.  HEAD:  Normocephalic.  EYES:  Pupils equal, round and reactive to light.  EOMs intact.  Conjunctivae normal.  EARS:  Hearing intact.  NOSE:  Septum midline.  No excoriations or nasal discharge.  MOUTH:  Tongue is well-papillated; no stomatitis or ulcers.  Lips normal.  THROAT:  Oropharynx without lesions or exudates.  NECK:  Supple with good range of motion; no thyromegaly or masses, no JVD.  LYMPHATICS:  No cervical, supraclavicular, axillary or inguinal adenopathy.  CHEST:  Lungs clear to percussion and auscultation. Good airflow.  CARDIAC:  Regular rate and rhythm without murmurs, rubs or gallops. Normal S1,S2.  ABDOMEN:  Soft, nontender with no organomegaly or masses.  EXTREMITIES:  No clubbing, cyanosis or edema.  NEUROLOGICAL:  Cranial Nerves II-XII grossly intact.  No focal neurological deficits.  PSYCHIATRIC:  Normal affect and mood.        RECENT LABS:  Hematology WBC   Date Value Ref Range Status   05/01/2018 6.92 4.00 - 10.00 10*3/mm3 Final     RBC   Date Value Ref Range Status   05/01/2018 4.12 " (L) 4.30 - 5.50 10*6/mm3 Final     Hemoglobin   Date Value Ref Range Status   05/01/2018 12.9 (L) 13.5 - 16.5 g/dL Final     Hematocrit   Date Value Ref Range Status   05/01/2018 39.0 (L) 40.0 - 49.0 % Final     Platelets   Date Value Ref Range Status   05/01/2018 286 150 - 375 10*3/mm3 Final       M protein #1 -0.9 g   M protein #2 -0.2G      Skeletal survey  IMPRESSION:  1. No definite evidence of neoplasm.  2. Several old thoracic compression deformities.      This report was finalized on 10/19/2017 2:16 PM by Dr. Froylan Mendoza,    Assessment/Plan   1.  MGUS with very slowly increasing paraprotein-no MRI evidence of myeloma in 10/16-skeletal survey negative in 10/17  2.  Primary hyperparathyroidism post parathyroidectomy  3.  Scheuermann's disease  4.  Peripheral neuropathy improved    Plan.  Repeat paraprotein studies in 3months with urine studies  See me in 6 months with repeat studies at that time-  If he moved to [place] before then we will get him a follow-up with Dr. Mercado we saw before for second opinion              5/8/2018      CC:

## 2018-07-31 ENCOUNTER — LAB (OUTPATIENT)
Dept: LAB | Facility: HOSPITAL | Age: 58
End: 2018-07-31

## 2018-07-31 DIAGNOSIS — D47.2 MONOCLONAL GAMMOPATHIES: ICD-10-CM

## 2018-07-31 LAB — B2 MICROGLOB SERPL-MCNC: 1.8 MG/L (ref 0.8–2.2)

## 2018-07-31 PROCEDURE — 82232 ASSAY OF BETA-2 PROTEIN: CPT | Performed by: INTERNAL MEDICINE

## 2018-07-31 PROCEDURE — 36415 COLL VENOUS BLD VENIPUNCTURE: CPT | Performed by: INTERNAL MEDICINE

## 2018-08-01 LAB
ALBUMIN SERPL-MCNC: 4.4 G/DL (ref 2.9–4.4)
ALBUMIN/GLOB SERPL: 1.4 {RATIO} (ref 0.7–1.7)
ALPHA1 GLOB FLD ELPH-MCNC: 0.2 G/DL (ref 0–0.4)
ALPHA2 GLOB SERPL ELPH-MCNC: 0.5 G/DL (ref 0.4–1)
B-GLOBULIN SERPL ELPH-MCNC: 0.7 G/DL (ref 0.7–1.3)
GAMMA GLOB SERPL ELPH-MCNC: 1.7 G/DL (ref 0.4–1.8)
GLOBULIN SER CALC-MCNC: 3.2 G/DL (ref 2.2–3.9)
IGA SERPL-MCNC: 75 MG/DL (ref 90–386)
IGG SERPL-MCNC: 1787 MG/DL (ref 700–1600)
IGM SERPL-MCNC: 37 MG/DL (ref 20–172)
INTERPRETATION SERPL IEP-IMP: ABNORMAL
KAPPA LC SERPL-MCNC: 12.6 MG/L (ref 3.3–19.4)
KAPPA LC/LAMBDA SER: 1.31 {RATIO} (ref 0.26–1.65)
LAMBDA LC FREE SERPL-MCNC: 9.6 MG/L (ref 5.7–26.3)
Lab: ABNORMAL
M-SPIKE: ABNORMAL G/DL
PROT SERPL-MCNC: 7.6 G/DL (ref 6–8.5)

## 2018-08-02 LAB
ALBUMIN 24H MFR UR ELPH: 14 %
ALPHA1 GLOB 24H MFR UR ELPH: 9.1 %
ALPHA2 GLOB 24H MFR UR ELPH: 12.1 %
B-GLOBULIN MFR UR ELPH: 36.3 %
FREE KAPPA LT CHAINS, 24HR: 42 MG/24 HR
FREE LAMBDA LT CHAINS, 24 HR: 1 MG/24 HR
GAMMA GLOB 24H MFR UR ELPH: 28.5 %
HIV 1 & 2 AB SER-IMP: ABNORMAL
INTERPRETATION UR IFE-IMP: ABNORMAL
KAPPA LC UR-MCNC: 10.3 MG/L (ref 1.35–24.19)
KAPPA LC/LAMBDA UR: 33.23 {RATIO} (ref 2.04–10.37)
LAMBDA LC UR-MCNC: 0.31 MG/L (ref 0.24–6.66)
M PROTEIN 24H MFR UR ELPH: ABNORMAL %
PROT 24H UR-MRATE: 190 MG/24 HR (ref 30–150)
PROT UR-MCNC: 4.7 MG/DL

## 2018-10-16 ENCOUNTER — LAB (OUTPATIENT)
Dept: LAB | Facility: HOSPITAL | Age: 58
End: 2018-10-16

## 2018-10-16 DIAGNOSIS — D47.2 MONOCLONAL GAMMOPATHIES: ICD-10-CM

## 2018-10-16 LAB
ALBUMIN SERPL-MCNC: 4.3 G/DL (ref 3.5–5.2)
ALBUMIN/GLOB SERPL: 1.4 G/DL (ref 1.1–2.4)
ALP SERPL-CCNC: 34 U/L (ref 38–116)
ALT SERPL W P-5'-P-CCNC: 27 U/L (ref 0–41)
ANION GAP SERPL CALCULATED.3IONS-SCNC: 10.4 MMOL/L
AST SERPL-CCNC: 33 U/L (ref 0–40)
BASOPHILS # BLD AUTO: 0.05 10*3/MM3 (ref 0–0.1)
BASOPHILS NFR BLD AUTO: 0.9 % (ref 0–1.1)
BILIRUB SERPL-MCNC: 0.3 MG/DL (ref 0.1–1.2)
BUN BLD-MCNC: 27 MG/DL (ref 6–20)
BUN/CREAT SERPL: 24.8 (ref 7.3–30)
CALCIUM SPEC-SCNC: 10.3 MG/DL (ref 8.5–10.2)
CHLORIDE SERPL-SCNC: 101 MMOL/L (ref 98–107)
CO2 SERPL-SCNC: 26.6 MMOL/L (ref 22–29)
CREAT BLD-MCNC: 1.09 MG/DL (ref 0.7–1.3)
DEPRECATED RDW RBC AUTO: 45.1 FL (ref 37–49)
EOSINOPHIL # BLD AUTO: 0.19 10*3/MM3 (ref 0–0.36)
EOSINOPHIL NFR BLD AUTO: 3.3 % (ref 1–5)
ERYTHROCYTE [DISTWIDTH] IN BLOOD BY AUTOMATED COUNT: 13.3 % (ref 11.7–14.5)
GFR SERPL CREATININE-BSD FRML MDRD: 70 ML/MIN/1.73
GLOBULIN UR ELPH-MCNC: 3.1 GM/DL (ref 1.8–3.5)
GLUCOSE BLD-MCNC: 108 MG/DL (ref 74–124)
HCT VFR BLD AUTO: 38.8 % (ref 40–49)
HGB BLD-MCNC: 13 G/DL (ref 13.5–16.5)
IMM GRANULOCYTES # BLD: 0.01 10*3/MM3 (ref 0–0.03)
IMM GRANULOCYTES NFR BLD: 0.2 % (ref 0–0.5)
LYMPHOCYTES # BLD AUTO: 0.89 10*3/MM3 (ref 1–3.5)
LYMPHOCYTES NFR BLD AUTO: 15.3 % (ref 20–49)
MCH RBC QN AUTO: 31 PG (ref 27–33)
MCHC RBC AUTO-ENTMCNC: 33.5 G/DL (ref 32–35)
MCV RBC AUTO: 92.6 FL (ref 83–97)
MONOCYTES # BLD AUTO: 0.44 10*3/MM3 (ref 0.25–0.8)
MONOCYTES NFR BLD AUTO: 7.5 % (ref 4–12)
NEUTROPHILS # BLD AUTO: 4.25 10*3/MM3 (ref 1.5–7)
NEUTROPHILS NFR BLD AUTO: 72.8 % (ref 39–75)
NRBC BLD MANUAL-RTO: 0 /100 WBC (ref 0–0)
PLATELET # BLD AUTO: 289 10*3/MM3 (ref 150–375)
PMV BLD AUTO: 8.7 FL (ref 8.9–12.1)
POTASSIUM BLD-SCNC: 4.3 MMOL/L (ref 3.5–4.7)
PROT SERPL-MCNC: 7.4 G/DL (ref 6.3–8)
RBC # BLD AUTO: 4.19 10*6/MM3 (ref 4.3–5.5)
SODIUM BLD-SCNC: 138 MMOL/L (ref 134–145)
WBC NRBC COR # BLD: 5.83 10*3/MM3 (ref 4–10)

## 2018-10-16 PROCEDURE — 36415 COLL VENOUS BLD VENIPUNCTURE: CPT | Performed by: INTERNAL MEDICINE

## 2018-10-16 PROCEDURE — 85025 COMPLETE CBC W/AUTO DIFF WBC: CPT | Performed by: INTERNAL MEDICINE

## 2018-10-16 PROCEDURE — 80053 COMPREHEN METABOLIC PANEL: CPT | Performed by: INTERNAL MEDICINE

## 2018-10-17 LAB
ALBUMIN SERPL-MCNC: 4.2 G/DL (ref 2.9–4.4)
ALBUMIN/GLOB SERPL: 1.3 {RATIO} (ref 0.7–1.7)
ALPHA1 GLOB FLD ELPH-MCNC: 0.2 G/DL (ref 0–0.4)
ALPHA2 GLOB SERPL ELPH-MCNC: 0.5 G/DL (ref 0.4–1)
B-GLOBULIN SERPL ELPH-MCNC: 2.1 G/DL (ref 0.7–1.3)
GAMMA GLOB SERPL ELPH-MCNC: 0.5 G/DL (ref 0.4–1.8)
GLOBULIN SER CALC-MCNC: 3.3 G/DL (ref 2.2–3.9)
IGA SERPL-MCNC: 75 MG/DL (ref 90–386)
IGG SERPL-MCNC: 2014 MG/DL (ref 700–1600)
IGM SERPL-MCNC: 37 MG/DL (ref 20–172)
INTERPRETATION SERPL IEP-IMP: ABNORMAL
KAPPA LC SERPL-MCNC: 11.8 MG/L (ref 3.3–19.4)
KAPPA LC/LAMBDA SER: 1.22 {RATIO} (ref 0.26–1.65)
LAMBDA LC FREE SERPL-MCNC: 9.7 MG/L (ref 5.7–26.3)
Lab: ABNORMAL
M-SPIKE: ABNORMAL G/DL
PROT SERPL-MCNC: 7.5 G/DL (ref 6–8.5)

## 2018-10-23 ENCOUNTER — OFFICE (AMBULATORY)
Dept: URBAN - METROPOLITAN AREA CLINIC 64 | Facility: CLINIC | Age: 58
End: 2018-10-23

## 2018-10-23 VITALS
HEIGHT: 73 IN | HEART RATE: 62 BPM | SYSTOLIC BLOOD PRESSURE: 127 MMHG | WEIGHT: 181 LBS | DIASTOLIC BLOOD PRESSURE: 90 MMHG

## 2018-10-23 DIAGNOSIS — K64.2 THIRD DEGREE HEMORRHOIDS: ICD-10-CM

## 2018-10-23 DIAGNOSIS — K64.8 OTHER HEMORRHOIDS: ICD-10-CM

## 2018-10-23 DIAGNOSIS — K62.5 HEMORRHAGE OF ANUS AND RECTUM: ICD-10-CM

## 2018-10-23 PROCEDURE — 99212 OFFICE O/P EST SF 10 MIN: CPT | Performed by: INTERNAL MEDICINE

## 2018-10-23 RX ORDER — HYDROCORTISONE ACETATE 25 MG/1
25 SUPPOSITORY RECTAL
Qty: 14 | Refills: 1 | Status: ACTIVE
Start: 2018-10-23

## 2018-10-23 RX ORDER — HYDROCORTISONE 25 MG/G
OINTMENT TOPICAL
Qty: 30 | Refills: 6 | Status: COMPLETED
Start: 2018-10-23 | End: 2019-01-16

## 2018-10-24 ENCOUNTER — APPOINTMENT (OUTPATIENT)
Dept: LAB | Facility: HOSPITAL | Age: 58
End: 2018-10-24

## 2018-10-24 ENCOUNTER — OFFICE VISIT (OUTPATIENT)
Dept: ONCOLOGY | Facility: CLINIC | Age: 58
End: 2018-10-24

## 2018-10-24 VITALS
RESPIRATION RATE: 16 BRPM | OXYGEN SATURATION: 98 % | HEIGHT: 74 IN | HEART RATE: 63 BPM | BODY MASS INDEX: 22.84 KG/M2 | DIASTOLIC BLOOD PRESSURE: 60 MMHG | SYSTOLIC BLOOD PRESSURE: 118 MMHG | WEIGHT: 178 LBS | TEMPERATURE: 98.2 F

## 2018-10-24 DIAGNOSIS — M42.00 SCHEUERMANN DISEASE: ICD-10-CM

## 2018-10-24 DIAGNOSIS — E21.3 HYPERPARATHYROIDISM (HCC): ICD-10-CM

## 2018-10-24 DIAGNOSIS — E83.52 HYPERCALCEMIA: Primary | ICD-10-CM

## 2018-10-24 DIAGNOSIS — D47.2 MONOCLONAL GAMMOPATHIES: ICD-10-CM

## 2018-10-24 PROCEDURE — 99214 OFFICE O/P EST MOD 30 MIN: CPT | Performed by: INTERNAL MEDICINE

## 2018-10-24 PROCEDURE — G0463 HOSPITAL OUTPT CLINIC VISIT: HCPCS | Performed by: INTERNAL MEDICINE

## 2018-10-24 RX ORDER — INFLUENZA A VIRUS A/SINGAPORE/GP1908/2015 IVR-180A (H1N1) ANTIGEN (PROPIOLACTONE INACTIVATED), INFLUENZA A VIRUS A/SINGAPORE/INFIMH-16-0019/2016 IVR-186 (H3N2) ANTIGEN (PROPIOLACTONE INACTIVATED), INFLUENZA B VIRUS B/MARYLAND/15/2016 ANTIGEN (PROPIOLACTONE INACTIVATED), AND INFLUENZA B VIRUS B/PHUKET/3073/2013 BVR-1B ANTIGEN (PROPIOLACTONE INACTIVATED) 15; 15; 15; 15 UG/.5ML; UG/.5ML; UG/.5ML; UG/.5ML
INJECTION, SUSPENSION INTRAMUSCULAR
Refills: 0 | COMMUNITY
Start: 2018-09-19

## 2018-10-24 NOTE — PROGRESS NOTES
Subjective    REASONS FOR FOLLOWUP:   1. Monoclonal gammopathy of undetermined significance with normal free light ratio.   2. Hypercalcemia related to hyperparathyroidism.   3. Multiple compression fractures at thoracic spine. Scheuermann's disease?   4. Iron deficiency due to frequent blood donations. Negative gastrointestinal workup.   5. Increased plasma cells in the marrow 5 to 10%.   6.  peripheral neuropathy (?) B12 deficiency.  improved  7. Abdominal fat pad biopsy done in [place], negative for amyloid.         History of Present Illness  patient is a 58 year-old male with a monoclonal gammopathy of undetermined significance along with primary hyperparathyroidism and Scheuermann's disease which complicated the picture significantly.  We have been watching his paraprotein over the last 3.5 years and it has remained very stable and  he remains asymptomatic.  He has no bone pain and his neuropathy which almost completely resolved and is slowly recurring     His paraprotein today is slightlyhigher than it has been in the last 6 months but his free light chain ratio is still stable  His hemoglobin is  stable.  He is retired and has been drinking a lot more bourbon and beer and I cautioned him against this as this may have aggravated his neuropathy in the past  .   He continues with  chronic low back pain which he has had for many years and probably from his Scheuermann's disease but no signs of sciatica which is surprising considering the amount of degenerative disease he has in his lumbar spine.    His calcium is creeping up again at 10.3 were have to keep an eye on it and make sure his parathyroids are not acting up again    He is retired now and considering moving back to [place] where he is from originally and he will follow-up with Dr. Mercado who we saw there before for second opinion    PAST MEDICAL HISTORY: Significant only for asthma which he has had since childhood and for which he is on a steroid  inhaler for the last 12 years. He has a history of jaundice at birth treated with an exchange transfusion possibly from an Rh incompatibility. He has a history in his right big toe and history of degenerative changes in the thoracic spine with wedge deformities on two mid thoracic vertebral bodies noted on a routine chest x-ray in 2005  .   SURGICAL HISTORY: Colonoscopies, EGD with dilatation of an esophageal stricture in 2009 and sinus surgery in 2005.     ONCOLOGIC/HEMATOLOGIC HISTORY: History from previous dates can be found in the separate document.   The patient was noted on blood work as far back as 2005 to have mildly elevated calcium in the mid-10 range. The patient's calcium elevation persisted and this year went as high as 11 and this prompted further evaluation with an endocrinology visit. Dr. Aliyah Luevano is evaluating for a hyperparathyroidism but in the course of her workup ordered a serum protein electrophoresis which showed a biclonal gammopathy with two separate paraproteins one being 1 gm, the other being . 2 gm and immunofixation was recommended. The patient was then referred to us for evaluation. He denies bone pains or weight loss or other complaints but has been mildly anemic over the last six months without any obvious cause. Blood work in Dr. Rocael juarez's office did show an elevated serum iron of 177, mild elevation of his glucose of uncertain significance. He has had no fever, sweats or weight loss or hematuria.   Lab data done in our office on 09/19/2013 shows sedimentation rate of 4, normal MMA, se rum iron 154, TIBC 435, and ferritin of 15. Normal B12 and folate. Beta 2 microglobulin normal. Calcium 10.8, SPEP confirms 1.2. Paraprotein 1 is 0.2 and other is 1.2 gm; both of them apparently IgG kappa. Serum free light chains normal.   Bone density shows mild osteopenia in spine and femoral neck. Ultrasound of kidneys is unremarkable. MRI of thoracic spine shows multiple Schmorl' s nodes  indenting end plates of T5 to T11 with some anterior wedging of T7-8, T8-9. This was felt to be a form of Scheuermann's disease involving thoracic spine. No signs or symptoms of myelomatous or neoplastic involvement.   Bone marrow aspirate and biopsy done to exclude a plasma cell dyscrasia although hypercalcemia appears to be related to hyperparathyroidism and bone lesions may be incidental also.   Bone marrow biopsy shows 5 to 8% plasma cells. There were not enough plasma cells to do FISH test. Cytogenetics was normal. Musculoskeletal survey showed no lytic lesions in the skull or long bones. Urine s tudy showed no monoclonal protein in the urine in light of the normal free light chain ratio and at this point I cannot make a diagnosis for multiple myeloma. I plan to follow closely and do an MRI of the cervical and lumbar spine to make sure there are n o bony lesions detectable there. Discussed the fact that he may be a smoldering myeloma, rather than a true MGUS, but we will have to watch for the time being.   The patient was seen on 01/20/2014 with MRIs of the C-spine, lumbar spine and pelvis, all of wh ich showed degenerative disease and no lesions suspicious for myeloma. Pathology report on his parathyroidectomy shows 4 normal-appearing parathyroid with hypoplasia, and no tumor. Repeat calcium levels are pending. Urine protein studies show no monoclona l protein or proteinuria, and at this point he appears to have MGUS with mild increase in marrow plasmacytosis, and no evidence of bony involvement, and the hypercalcemia is from hyperparathyroidism, and we need to follow him closely. He still may be movin g to the [place] area and does not know for sure.   The patient was seen on 05/13/2014 with stable M protein and negative urine paraprotein. He has fairly recent onset of neuropathy in his legs, left more than right noted. We will check the B12 level and give him B12 injections empirically until the results  are back and send him to a neurologist for evaluation. He may need a fat pad biopsy if there is no improvement of B12 to make sure he does not have amyloid neuropathy.   The patient was seen in [place] in 2014, fat pad biopsy was negative for amyloid. Neuropathy better off the B6 and B12. MGUS stable. Followup continued with plans to repeat the skeletal survey and thoracic MRI annually.   Patient seen 2015 with leukopenia, possibly relate d to a viral infection which is resolving, complaining of neck pain. Cervical MRI to be reviewed. Skeletal survey shows no change, with compression fractures in the mid-thoracic spine and no new lytic lesions. His SPEP shows a stable M protein, but hi s free light chain ratio is abnormal for the first time and needs to be watched closely.   Patient on 2015 with an MRI of the C-spine showing significant degenerative disease with no lesions of myeloma. His paraprotein is stable at 1.0 and his free l ight chain ratio has normalized. Continue followup with 2 month SPEPs and a urine SPEP in 2 months has been scheduled.   MRI of the spine in 10/16 shows no evidence of myeloma is to M proteins are 1.4 and 0.3 g/dl and urine protein shows us very small monoclonal protein-continued follow-up planned in the absence of anemia renal insufficiency or hypercalcemia     SOCIAL HISTORY: He is  and lives by himself. He does not smoke but drinks 50 beers a week and has been doing this for over 20 years. No risk factors for HIV.     FAMILY HISTORY: Father had melanoma at age 60, alive at 72. His mother is healthy. He has two brot hers who are healthy. He has a maternal grandmother with pancreatic cancer in her 50s and maternal grandfather who  of metastatic cancer in the abdomen in his 30s, possibly gastric cancer. Maternal uncle with acute leukemia who  recently. There is no family history of thyroid cancer or other endocrine malignancies or hypercalcemia. He has  "no children.     Review of Systems   Constitutional: Negative.  Negative for activity change, appetite change, chills, diaphoresis, fatigue and fever.   HENT: Negative.    Respiratory: Negative.    Cardiovascular: Negative.    Genitourinary: Negative.    Musculoskeletal: Positive for back pain (no change 10/24/2018).   Neurological: Negative.    Psychiatric/Behavioral: Negative.       A comprehensive 14 point review of systems was performed and was negative except as mentioned.      Current Outpatient Prescriptions:   •  ADVAIR DISKUS 250-50 MCG/DOSE DISKUS, , Disp: , Rfl:   •  AFLURIA QUADRIVALENT 0.5 ML suspension prefilled syringe injection, ADM 0.5ML IM UTD, Disp: , Rfl: 0  •  aspirin 81 MG chewable tablet, Chew 81 mg daily., Disp: , Rfl:   •  mometasone (NASONEX) 50 MCG/ACT nasal spray, 2 sprays into each nostril daily., Disp: , Rfl:       ALLERGIES:  No Known Allergies    Objective      Vitals:    10/24/18 1024   Weight: 80.7 kg (178 lb)   Height: 187 cm (73.62\")   PainSc: 0-No pain     Current Status 10/24/2018   ECOG score 0       Physical Exam    GENERAL:  Well-developed, well-nourished in no acute distress.   SKIN:  Warm, dry without rashes, purpura or petechiae.  HEAD:  Normocephalic.  EYES:  Pupils equal, round and reactive to light.  EOMs intact.  Conjunctivae normal.  EARS:  Hearing intact.  NOSE:  Septum midline.  No excoriations or nasal discharge.  MOUTH:  Tongue is well-papillated; no stomatitis or ulcers.  Lips normal.  THROAT:  Oropharynx without lesions or exudates.  NECK:  Supple with good range of motion; no thyromegaly or masses, no JVD.  LYMPHATICS:  No cervical, supraclavicular, axillary or inguinal adenopathy.  CHEST:  Lungs clear to percussion and auscultation. Good airflow.  CARDIAC:  Regular rate and rhythm without murmurs, rubs or gallops. Normal S1,S2.  ABDOMEN:  Soft, nontender with no organomegaly or masses.  EXTREMITIES:  No clubbing, cyanosis or edema.  NEUROLOGICAL:  Cranial " Nerves II-XII grossly intact.  No focal neurological deficits.  PSYCHIATRIC:  Normal affect and mood.        RECENT LABS:  Hematology WBC   Date Value Ref Range Status   10/16/2018 5.83 4.00 - 10.00 10*3/mm3 Final     RBC   Date Value Ref Range Status   10/16/2018 4.19 (L) 4.30 - 5.50 10*6/mm3 Final     Hemoglobin   Date Value Ref Range Status   10/16/2018 13.0 (L) 13.5 - 16.5 g/dL Final     Hematocrit   Date Value Ref Range Status   10/16/2018 38.8 (L) 40.0 - 49.0 % Final     Platelets   Date Value Ref Range Status   10/16/2018 289 150 - 375 10*3/mm3 Final       M protein #1 1.2 g   M protein #2 -0.2G      Skeletal survey  IMPRESSION:  1. No definite evidence of neoplasm.  2. Several old thoracic compression deformities.      This report was finalized on 10/19/2017 2:16 PM by Dr. Froylan Mendoza,    Assessment/Plan   1.  MGUS with very slowly increasing paraprotein-no MRI evidence of myeloma in 10/16-skeletal survey negative in 10/17  2.  Primary hyperparathyroidism post parathyroidectomy  3.  Scheuermann's disease  4.  Peripheral neuropathy improved    Plan.  1.Repeat paraprotein studies in 3months  2. See me in 6 months with repeat studies at that time-  If he moved to [place] before then we will get him a follow-up with Dr. Mercado we saw before for second opinion              10/24/2018      CC:

## 2018-12-14 ENCOUNTER — OFFICE (AMBULATORY)
Dept: URBAN - METROPOLITAN AREA CLINIC 64 | Facility: CLINIC | Age: 58
End: 2018-12-14

## 2018-12-14 VITALS — HEIGHT: 73 IN

## 2018-12-14 DIAGNOSIS — K64.2 THIRD DEGREE HEMORRHOIDS: ICD-10-CM

## 2018-12-14 PROBLEM — K62.5: Status: ACTIVE | Noted: 2018-12-14

## 2018-12-14 PROCEDURE — 46221 LIGATION OF HEMORRHOID(S): CPT | Performed by: INTERNAL MEDICINE

## 2019-01-16 ENCOUNTER — OFFICE (AMBULATORY)
Dept: URBAN - METROPOLITAN AREA CLINIC 64 | Facility: CLINIC | Age: 59
End: 2019-01-16

## 2019-01-16 VITALS
HEART RATE: 59 BPM | HEIGHT: 73 IN | SYSTOLIC BLOOD PRESSURE: 124 MMHG | WEIGHT: 180 LBS | DIASTOLIC BLOOD PRESSURE: 74 MMHG

## 2019-01-16 DIAGNOSIS — K64.8 OTHER HEMORRHOIDS: ICD-10-CM

## 2019-01-16 PROCEDURE — 99211 OFF/OP EST MAY X REQ PHY/QHP: CPT | Performed by: INTERNAL MEDICINE

## 2019-01-24 ENCOUNTER — LAB (OUTPATIENT)
Dept: LAB | Facility: HOSPITAL | Age: 59
End: 2019-01-24

## 2019-01-24 DIAGNOSIS — D47.2 MONOCLONAL GAMMOPATHIES: ICD-10-CM

## 2019-01-24 LAB
ALBUMIN SERPL-MCNC: 4.4 G/DL (ref 3.5–5.2)
ALBUMIN/GLOB SERPL: 1.4 G/DL (ref 1.1–2.4)
ALP SERPL-CCNC: 35 U/L (ref 38–116)
ALT SERPL W P-5'-P-CCNC: 20 U/L (ref 0–41)
ANION GAP SERPL CALCULATED.3IONS-SCNC: 12.5 MMOL/L
AST SERPL-CCNC: 29 U/L (ref 0–40)
B2 MICROGLOB SERPL-MCNC: 1.5 MG/L (ref 0.8–2.2)
BASOPHILS # BLD AUTO: 0.06 10*3/MM3 (ref 0–0.1)
BASOPHILS NFR BLD AUTO: 1.1 % (ref 0–1.1)
BILIRUB SERPL-MCNC: 0.3 MG/DL (ref 0.1–1.2)
BUN BLD-MCNC: 15 MG/DL (ref 6–20)
BUN/CREAT SERPL: 14.3 (ref 7.3–30)
CALCIUM SPEC-SCNC: 10.6 MG/DL (ref 8.5–10.2)
CHLORIDE SERPL-SCNC: 102 MMOL/L (ref 98–107)
CO2 SERPL-SCNC: 25.5 MMOL/L (ref 22–29)
CREAT BLD-MCNC: 1.05 MG/DL (ref 0.7–1.3)
DEPRECATED RDW RBC AUTO: 44.1 FL (ref 37–49)
EOSINOPHIL # BLD AUTO: 0.08 10*3/MM3 (ref 0–0.36)
EOSINOPHIL NFR BLD AUTO: 1.4 % (ref 1–5)
ERYTHROCYTE [DISTWIDTH] IN BLOOD BY AUTOMATED COUNT: 13 % (ref 11.7–14.5)
GFR SERPL CREATININE-BSD FRML MDRD: 73 ML/MIN/1.73
GLOBULIN UR ELPH-MCNC: 3.1 GM/DL (ref 1.8–3.5)
GLUCOSE BLD-MCNC: 124 MG/DL (ref 74–124)
HCT VFR BLD AUTO: 39 % (ref 40–49)
HGB BLD-MCNC: 13.2 G/DL (ref 13.5–16.5)
IMM GRANULOCYTES # BLD AUTO: 0.01 10*3/MM3 (ref 0–0.03)
IMM GRANULOCYTES NFR BLD AUTO: 0.2 % (ref 0–0.5)
LYMPHOCYTES # BLD AUTO: 1.03 10*3/MM3 (ref 1–3.5)
LYMPHOCYTES NFR BLD AUTO: 18.4 % (ref 20–49)
MCH RBC QN AUTO: 31.3 PG (ref 27–33)
MCHC RBC AUTO-ENTMCNC: 33.8 G/DL (ref 32–35)
MCV RBC AUTO: 92.4 FL (ref 83–97)
MONOCYTES # BLD AUTO: 0.5 10*3/MM3 (ref 0.25–0.8)
MONOCYTES NFR BLD AUTO: 8.9 % (ref 4–12)
NEUTROPHILS # BLD AUTO: 3.91 10*3/MM3 (ref 1.5–7)
NEUTROPHILS NFR BLD AUTO: 70 % (ref 39–75)
NRBC BLD AUTO-RTO: 0 /100 WBC (ref 0–0)
PLATELET # BLD AUTO: 284 10*3/MM3 (ref 150–375)
PMV BLD AUTO: 8.6 FL (ref 8.9–12.1)
POTASSIUM BLD-SCNC: 4.2 MMOL/L (ref 3.5–4.7)
PROT SERPL-MCNC: 7.5 G/DL (ref 6.3–8)
RBC # BLD AUTO: 4.22 10*6/MM3 (ref 4.3–5.5)
SODIUM BLD-SCNC: 140 MMOL/L (ref 134–145)
WBC NRBC COR # BLD: 5.59 10*3/MM3 (ref 4–10)

## 2019-01-24 PROCEDURE — 80053 COMPREHEN METABOLIC PANEL: CPT | Performed by: INTERNAL MEDICINE

## 2019-01-24 PROCEDURE — 85025 COMPLETE CBC W/AUTO DIFF WBC: CPT | Performed by: INTERNAL MEDICINE

## 2019-01-24 PROCEDURE — 82232 ASSAY OF BETA-2 PROTEIN: CPT | Performed by: INTERNAL MEDICINE

## 2019-01-24 PROCEDURE — 36415 COLL VENOUS BLD VENIPUNCTURE: CPT | Performed by: INTERNAL MEDICINE

## 2019-01-25 LAB
ALBUMIN SERPL-MCNC: 4 G/DL (ref 2.9–4.4)
ALBUMIN/GLOB SERPL: 1.2 {RATIO} (ref 0.7–1.7)
ALPHA1 GLOB FLD ELPH-MCNC: 0.2 G/DL (ref 0–0.4)
ALPHA2 GLOB SERPL ELPH-MCNC: 0.5 G/DL (ref 0.4–1)
B-GLOBULIN SERPL ELPH-MCNC: 0.8 G/DL (ref 0.7–1.3)
GAMMA GLOB SERPL ELPH-MCNC: 1.9 G/DL (ref 0.4–1.8)
GLOBULIN SER CALC-MCNC: 3.4 G/DL (ref 2.2–3.9)
IGA SERPL-MCNC: 67 MG/DL (ref 90–386)
IGG SERPL-MCNC: 1865 MG/DL (ref 700–1600)
IGM SERPL-MCNC: 37 MG/DL (ref 20–172)
INTERPRETATION SERPL IEP-IMP: ABNORMAL
KAPPA LC SERPL-MCNC: 10.1 MG/L (ref 3.3–19.4)
KAPPA LC/LAMBDA SER: 1.38 {RATIO} (ref 0.26–1.65)
LAMBDA LC FREE SERPL-MCNC: 7.3 MG/L (ref 5.7–26.3)
Lab: ABNORMAL
M-SPIKE: ABNORMAL G/DL
PROT SERPL-MCNC: 7.4 G/DL (ref 6–8.5)

## 2019-03-20 DIAGNOSIS — E83.52 HYPERCALCEMIA: ICD-10-CM

## 2019-03-20 DIAGNOSIS — M42.00 SCHEUERMANN DISEASE: ICD-10-CM

## 2019-03-20 DIAGNOSIS — D47.2 MONOCLONAL GAMMOPATHIES: Primary | ICD-10-CM

## 2019-03-20 NOTE — PROGRESS NOTES
Orders entered for MRI of thoracic spine and MRI of lumbar spine, both to be done with and without contrast. Please schedule on the same date/time as brain MRI, to be ordered by patient's ENT as patient has requested these to be done at the same time per in-basket message Dr. Phillips

## 2019-03-28 ENCOUNTER — TRANSCRIBE ORDERS (OUTPATIENT)
Dept: ADMINISTRATIVE | Facility: HOSPITAL | Age: 59
End: 2019-03-28

## 2019-03-28 DIAGNOSIS — H90.5 SENSORINEURAL HEARING LOSS (SNHL), UNSPECIFIED LATERALITY: Primary | ICD-10-CM

## 2019-04-08 DIAGNOSIS — M42.00 SCHEUERMANN DISEASE: Primary | ICD-10-CM

## 2019-04-09 ENCOUNTER — HOSPITAL ENCOUNTER (OUTPATIENT)
Dept: MRI IMAGING | Facility: HOSPITAL | Age: 59
Discharge: HOME | End: 2019-04-09

## 2019-04-09 ENCOUNTER — HOSPITAL ENCOUNTER (OUTPATIENT)
Dept: MRI IMAGING | Facility: HOSPITAL | Age: 59
Discharge: HOME | End: 2019-04-09
Admitting: INTERNAL MEDICINE

## 2019-04-09 ENCOUNTER — APPOINTMENT (OUTPATIENT)
Dept: MRI IMAGING | Facility: HOSPITAL | Age: 59
End: 2019-04-09

## 2019-04-09 DIAGNOSIS — H90.5 SENSORINEURAL HEARING LOSS (SNHL), UNSPECIFIED LATERALITY: ICD-10-CM

## 2019-04-09 DIAGNOSIS — D47.2 MONOCLONAL GAMMOPATHIES: ICD-10-CM

## 2019-04-09 DIAGNOSIS — E83.52 HYPERCALCEMIA: ICD-10-CM

## 2019-04-09 DIAGNOSIS — M42.00 SCHEUERMANN DISEASE: ICD-10-CM

## 2019-04-09 LAB — CREAT BLDA-MCNC: 0.9 MG/DL (ref 0.6–1.3)

## 2019-04-09 PROCEDURE — A9577 INJ MULTIHANCE: HCPCS | Performed by: PHYSICIAN ASSISTANT

## 2019-04-09 PROCEDURE — 0 GADOBENATE DIMEGLUMINE 529 MG/ML SOLUTION: Performed by: PHYSICIAN ASSISTANT

## 2019-04-09 PROCEDURE — 72157 MRI CHEST SPINE W/O & W/DYE: CPT

## 2019-04-09 PROCEDURE — 82565 ASSAY OF CREATININE: CPT

## 2019-04-09 PROCEDURE — 72158 MRI LUMBAR SPINE W/O & W/DYE: CPT

## 2019-04-09 PROCEDURE — 70553 MRI BRAIN STEM W/O & W/DYE: CPT

## 2019-04-09 RX ADMIN — GADOBENATE DIMEGLUMINE 16 ML: 529 INJECTION, SOLUTION INTRAVENOUS at 08:40

## 2019-04-25 ENCOUNTER — LAB (OUTPATIENT)
Dept: LAB | Facility: HOSPITAL | Age: 59
End: 2019-04-25

## 2019-04-25 DIAGNOSIS — M42.00 SCHEUERMANN DISEASE: ICD-10-CM

## 2019-04-25 DIAGNOSIS — D47.2 MONOCLONAL GAMMOPATHIES: ICD-10-CM

## 2019-04-25 LAB
ALBUMIN SERPL-MCNC: 4.1 G/DL (ref 3.5–5.2)
ALBUMIN/GLOB SERPL: 1.1 G/DL (ref 1.1–2.4)
ALP SERPL-CCNC: 40 U/L (ref 38–116)
ALT SERPL W P-5'-P-CCNC: 20 U/L (ref 0–41)
ANION GAP SERPL CALCULATED.3IONS-SCNC: 12.4 MMOL/L
AST SERPL-CCNC: 28 U/L (ref 0–40)
BASOPHILS # BLD AUTO: 0.06 10*3/MM3 (ref 0–0.2)
BASOPHILS NFR BLD AUTO: 0.9 % (ref 0–1.5)
BILIRUB SERPL-MCNC: 0.5 MG/DL (ref 0.2–1.2)
BUN BLD-MCNC: 18 MG/DL (ref 6–20)
BUN/CREAT SERPL: 17.1 (ref 7.3–30)
CALCIUM SPEC-SCNC: 10.7 MG/DL (ref 8.5–10.2)
CHLORIDE SERPL-SCNC: 101 MMOL/L (ref 98–107)
CO2 SERPL-SCNC: 25.6 MMOL/L (ref 22–29)
CREAT BLD-MCNC: 1.05 MG/DL (ref 0.7–1.3)
DEPRECATED RDW RBC AUTO: 47.3 FL (ref 37–54)
EOSINOPHIL # BLD AUTO: 0.23 10*3/MM3 (ref 0–0.4)
EOSINOPHIL NFR BLD AUTO: 3.5 % (ref 0.3–6.2)
ERYTHROCYTE [DISTWIDTH] IN BLOOD BY AUTOMATED COUNT: 13.9 % (ref 12.3–15.4)
GFR SERPL CREATININE-BSD FRML MDRD: 73 ML/MIN/1.73
GLOBULIN UR ELPH-MCNC: 3.6 GM/DL (ref 1.8–3.5)
GLUCOSE BLD-MCNC: 119 MG/DL (ref 74–124)
HCT VFR BLD AUTO: 38 % (ref 37.5–51)
HGB BLD-MCNC: 12.8 G/DL (ref 13–17.7)
IMM GRANULOCYTES # BLD AUTO: 0.02 10*3/MM3 (ref 0–0.05)
IMM GRANULOCYTES NFR BLD AUTO: 0.3 % (ref 0–0.5)
LYMPHOCYTES # BLD AUTO: 1.03 10*3/MM3 (ref 0.7–3.1)
LYMPHOCYTES NFR BLD AUTO: 15.6 % (ref 19.6–45.3)
MCH RBC QN AUTO: 31.2 PG (ref 26.6–33)
MCHC RBC AUTO-ENTMCNC: 33.7 G/DL (ref 31.5–35.7)
MCV RBC AUTO: 92.7 FL (ref 79–97)
MONOCYTES # BLD AUTO: 0.53 10*3/MM3 (ref 0.1–0.9)
MONOCYTES NFR BLD AUTO: 8 % (ref 5–12)
NEUTROPHILS # BLD AUTO: 4.72 10*3/MM3 (ref 1.7–7)
NEUTROPHILS NFR BLD AUTO: 71.7 % (ref 42.7–76)
NRBC BLD AUTO-RTO: 0 /100 WBC (ref 0–0.2)
PLATELET # BLD AUTO: 291 10*3/MM3 (ref 140–450)
PMV BLD AUTO: 8.7 FL (ref 6–12)
POTASSIUM BLD-SCNC: 4.1 MMOL/L (ref 3.5–4.7)
PROT SERPL-MCNC: 7.7 G/DL (ref 6.3–8)
RBC # BLD AUTO: 4.1 10*6/MM3 (ref 4.14–5.8)
SODIUM BLD-SCNC: 139 MMOL/L (ref 134–145)
WBC NRBC COR # BLD: 6.59 10*3/MM3 (ref 3.4–10.8)

## 2019-04-25 PROCEDURE — 36415 COLL VENOUS BLD VENIPUNCTURE: CPT | Performed by: INTERNAL MEDICINE

## 2019-04-25 PROCEDURE — 85025 COMPLETE CBC W/AUTO DIFF WBC: CPT | Performed by: INTERNAL MEDICINE

## 2019-04-25 PROCEDURE — 80053 COMPREHEN METABOLIC PANEL: CPT | Performed by: INTERNAL MEDICINE

## 2019-04-26 LAB
ALBUMIN SERPL-MCNC: 3.9 G/DL (ref 2.9–4.4)
ALBUMIN/GLOB SERPL: 1.2 {RATIO} (ref 0.7–1.7)
ALPHA1 GLOB FLD ELPH-MCNC: 0.2 G/DL (ref 0–0.4)
ALPHA2 GLOB SERPL ELPH-MCNC: 0.6 G/DL (ref 0.4–1)
B-GLOBULIN SERPL ELPH-MCNC: 2.2 G/DL (ref 0.7–1.3)
GAMMA GLOB SERPL ELPH-MCNC: 0.5 G/DL (ref 0.4–1.8)
GLOBULIN SER CALC-MCNC: 3.5 G/DL (ref 2.2–3.9)
IGA SERPL-MCNC: 68 MG/DL (ref 90–386)
IGG SERPL-MCNC: 1925 MG/DL (ref 700–1600)
IGM SERPL-MCNC: 35 MG/DL (ref 20–172)
INTERPRETATION SERPL IEP-IMP: ABNORMAL
KAPPA LC SERPL-MCNC: 11.7 MG/L (ref 3.3–19.4)
KAPPA LC/LAMBDA SER: 1.6 {RATIO} (ref 0.26–1.65)
LAMBDA LC FREE SERPL-MCNC: 7.3 MG/L (ref 5.7–26.3)
Lab: ABNORMAL
M-SPIKE: ABNORMAL G/DL
PROT SERPL-MCNC: 7.4 G/DL (ref 6–8.5)

## 2019-04-29 LAB
ALBUMIN 24H MFR UR ELPH: 29.9 %
ALPHA1 GLOB 24H MFR UR ELPH: 14.6 %
ALPHA2 GLOB 24H MFR UR ELPH: 22.5 %
B-GLOBULIN MFR UR ELPH: 20.2 %
GAMMA GLOB 24H MFR UR ELPH: 12.8 %
HIV 1 & 2 AB SER-IMP: ABNORMAL
INTERPRETATION UR IFE-IMP: ABNORMAL
M PROTEIN 24H MFR UR ELPH: ABNORMAL %
PROT 24H UR-MRATE: <192 MG/24 HR (ref 30–150)
PROT UR-MCNC: <4 MG/DL

## 2019-05-02 ENCOUNTER — APPOINTMENT (OUTPATIENT)
Dept: LAB | Facility: HOSPITAL | Age: 59
End: 2019-05-02

## 2019-05-02 ENCOUNTER — OFFICE VISIT (OUTPATIENT)
Dept: ONCOLOGY | Facility: CLINIC | Age: 59
End: 2019-05-02

## 2019-05-02 VITALS
BODY MASS INDEX: 22.33 KG/M2 | DIASTOLIC BLOOD PRESSURE: 65 MMHG | OXYGEN SATURATION: 99 % | HEIGHT: 74 IN | WEIGHT: 174 LBS | TEMPERATURE: 98.6 F | HEART RATE: 64 BPM | SYSTOLIC BLOOD PRESSURE: 131 MMHG | RESPIRATION RATE: 16 BRPM

## 2019-05-02 DIAGNOSIS — E21.3 HYPERPARATHYROIDISM (HCC): ICD-10-CM

## 2019-05-02 DIAGNOSIS — M42.00 SCHEUERMANN DISEASE: Primary | ICD-10-CM

## 2019-05-02 DIAGNOSIS — D47.2 MONOCLONAL GAMMOPATHIES: ICD-10-CM

## 2019-05-02 PROCEDURE — 99214 OFFICE O/P EST MOD 30 MIN: CPT | Performed by: INTERNAL MEDICINE

## 2019-05-02 NOTE — PROGRESS NOTES
Subjective    REASONS FOR FOLLOWUP:   1. Monoclonal gammopathy of undetermined significance with normal free light ratio.   2. Hypercalcemia related to hyperparathyroidism.   3. Multiple compression fractures at thoracic spine. Scheuermann's disease?   4. Iron deficiency due to frequent blood donations. Negative gastrointestinal workup.   5. Increased plasma cells in the marrow 5 to 10%.   6.  peripheral neuropathy (?) B12 deficiency.  improved  7. Abdominal fat pad biopsy done in [place], negative for amyloid.         History of Present Illness  patient is a 58 year-old male with a monoclonal gammopathy of undetermined significance along with primary hyperparathyroidism and Scheuermann's disease which complicated the picture significantly.  We have been watching his paraprotein over the last 3.5 years and it has remained very stable and  he remains asymptomatic.  He has no bone pain and his neuropathy which almost completely resolved and is slowly recurring     His paraprotein today is stable and his free light chain ratio is still stable and there is no detectable paraprotein in his urine.    He had MRIs of the brain due to some vertigo which was negative and in the process they also added a MRI of the thoracic and lumbar spine which showed his Scheuermann's disease but no signs of myeloma    His hemoglobin is  stable.  He is retired and has been drinking a lot more bourbon and beer and I cautioned him against this as this may have aggravated his neuropathy in the past  .   He continues with  chronic low back pain which he has had for many years and probably from his Scheuermann's disease but no signs of sciatica which is surprising considering the amount of degenerative disease he has in his lumbar spine.    His calcium is creeping up again at 10.7 were have to keep an eye on it and make sure his parathyroids are not acting up again    He is retired now and considering moving back to [place] where he is  from originally and he will follow-up with her oncologist there and call us to tell us where to send his records unless they have epic with acute disease records    Active Ambulatory Problems     Diagnosis Date Noted   • Monoclonal gammopathies 04/05/2016   • Hypercalcemia 08/09/2016   • Scheuermann disease 11/01/2016     Resolved Ambulatory Problems     Diagnosis Date Noted   • Hyperparathyroidism (CMS/HCC) 08/09/2016     Past Medical History:   Diagnosis Date   • Arthritis    • Asthma    • Compression fracture    • H/O foreign travel 02/2000   • Hypercalcemia    • Hyperparathyroidism (CMS/HCC)    • Iron deficiency anemia    • Monoclonal gammopathy of undetermined significance        : Significant only for asthma which he has had since childhood and for which he is on a steroid inhaler for the last 12 years. He has a history of jaundice at birth treated with an exchange transfusion possibly from an Rh incompatibility. He has a history in his right big toe and history of degenerative changes in the thoracic spine with wedge deformities on two mid thoracic vertebral bodies noted on a routine chest x-ray in 2005  .   SURGICAL HISTORY: Colonoscopies, EGD with dilatation of an esophageal stricture in 2009 and sinus surgery in 2005.     ONCOLOGIC/HEMATOLOGIC HISTORY: History from previous dates can be found in the separate document.   The patient was noted on blood work as far back as 2005 to have mildly elevated calcium in the mid-10 range. The patient's calcium elevation persisted and this year went as high as 11 and this prompted further evaluation with an endocrinology visit. Dr. Aliyah Luevano is evaluating for a hyperparathyroidism but in the course of her workup ordered a serum protein electrophoresis which showed a biclonal gammopathy with two separate paraproteins one being 1 gm, the other being . 2 gm and immunofixation was recommended. The patient was then referred to us for evaluation. He denies bone pains or  weight loss or other complaints but has been mildly anemic over the last six months without any obvious cause. Blood work in Dr. Rocael juarez's office did show an elevated serum iron of 177, mild elevation of his glucose of uncertain significance. He has had no fever, sweats or weight loss or hematuria.   Lab data done in our office on 09/19/2013 shows sedimentation rate of 4, normal MMA, se rum iron 154, TIBC 435, and ferritin of 15. Normal B12 and folate. Beta 2 microglobulin normal. Calcium 10.8, SPEP confirms 1.2. Paraprotein 1 is 0.2 and other is 1.2 gm; both of them apparently IgG kappa. Serum free light chains normal.   Bone density shows mild osteopenia in spine and femoral neck. Ultrasound of kidneys is unremarkable. MRI of thoracic spine shows multiple Schmorl' s nodes indenting end plates of T5 to T11 with some anterior wedging of T7-8, T8-9. This was felt to be a form of Scheuermann's disease involving thoracic spine. No signs or symptoms of myelomatous or neoplastic involvement.   Bone marrow aspirate and biopsy done to exclude a plasma cell dyscrasia although hypercalcemia appears to be related to hyperparathyroidism and bone lesions may be incidental also.   Bone marrow biopsy shows 5 to 8% plasma cells. There were not enough plasma cells to do FISH test. Cytogenetics was normal. Musculoskeletal survey showed no lytic lesions in the skull or long bones. Urine s tudy showed no monoclonal protein in the urine in light of the normal free light chain ratio and at this point I cannot make a diagnosis for multiple myeloma. I plan to follow closely and do an MRI of the cervical and lumbar spine to make sure there are n o bony lesions detectable there. Discussed the fact that he may be a smoldering myeloma, rather than a true MGUS, but we will have to watch for the time being.   The patient was seen on 01/20/2014 with MRIs of the C-spine, lumbar spine and pelvis, all of wh ich showed degenerative disease and  no lesions suspicious for myeloma. Pathology report on his parathyroidectomy shows 4 normal-appearing parathyroid with hypoplasia, and no tumor. Repeat calcium levels are pending. Urine protein studies show no monoclona l protein or proteinuria, and at this point he appears to have MGUS with mild increase in marrow plasmacytosis, and no evidence of bony involvement, and the hypercalcemia is from hyperparathyroidism, and we need to follow him closely. He still may be movin g to the [place] area and does not know for sure.   The patient was seen on 05/13/2014 with stable M protein and negative urine paraprotein. He has fairly recent onset of neuropathy in his legs, left more than right noted. We will check the B12 level and give him B12 injections empirically until the results are back and send him to a neurologist for evaluation. He may need a fat pad biopsy if there is no improvement of B12 to make sure he does not have amyloid neuropathy.   The patient was seen in [place] in July 2014, fat pad biopsy was negative for amyloid. Neuropathy better off the B6 and B12. MGUS stable. Followup continued with plans to repeat the skeletal survey and thoracic MRI annually.   Patient seen 01/09/2015 with leukopenia, possibly relate d to a viral infection which is resolving, complaining of neck pain. Cervical MRI to be reviewed. Skeletal survey shows no change, with compression fractures in the mid-thoracic spine and no new lytic lesions. His SPEP shows a stable M protein, but hi s free light chain ratio is abnormal for the first time and needs to be watched closely.   Patient on 05/04/2015 with an MRI of the C-spine showing significant degenerative disease with no lesions of myeloma. His paraprotein is stable at 1.0 and his free l ight chain ratio has normalized. Continue followup with 2 month SPEPs and a urine SPEP in 2 months has been scheduled.   MRI of the spine in 10/16 shows no evidence of myeloma is to M proteins  "are 1.4 and 0.3 g/dl and urine protein shows us very small monoclonal protein-continued follow-up planned in the absence of anemia renal insufficiency or hypercalcemia     SOCIAL HISTORY: He is  and lives by himself. He does not smoke but drinks 50 beers a week and has been doing this for over 20 years. No risk factors for HIV.     FAMILY HISTORY: Father had melanoma at age 60, alive at 72. His mother is healthy. He has two brot hers who are healthy. He has a maternal grandmother with pancreatic cancer in her 50s and maternal grandfather who  of metastatic cancer in the abdomen in his 30s, possibly gastric cancer. Maternal uncle with acute leukemia who  recently. There is no family history of thyroid cancer or other endocrine malignancies or hypercalcemia. He has no children.     Review of Systems   Constitutional: Negative.  Negative for activity change, appetite change, chills, diaphoresis, fatigue and fever.   HENT: Negative.    Respiratory: Negative.    Cardiovascular: Negative.    Genitourinary: Negative.    Musculoskeletal: Positive for back pain (no change 19).   Neurological: Negative.    Psychiatric/Behavioral: Negative.       A comprehensive 14 point review of systems was performed and was negative except as mentioned.      Current Outpatient Medications:   •  ADVAIR DISKUS 250-50 MCG/DOSE DISKUS, , Disp: , Rfl:   •  AFLURIA QUADRIVALENT 0.5 ML suspension prefilled syringe injection, ADM 0.5ML IM UTD, Disp: , Rfl: 0  •  mometasone (NASONEX) 50 MCG/ACT nasal spray, 2 sprays into each nostril daily., Disp: , Rfl:   •  aspirin 81 MG chewable tablet, Chew 81 mg daily., Disp: , Rfl:       ALLERGIES:  No Known Allergies    Objective      Vitals:    19 1003   BP: 131/65   Pulse: 64   Resp: 16   Temp: 98.6 °F (37 °C)   SpO2: 99%   Weight: 78.9 kg (174 lb)   Height: 187 cm (73.62\")   PainSc: 0-No pain     Current Status 2019   ECOG score 0       Physical Exam    GENERAL:  " Well-developed, well-nourished in no acute distress.   SKIN:  Warm, dry without rashes, purpura or petechiae.  HEAD:  Normocephalic.  EYES:  Pupils equal, round and reactive to light.  EOMs intact.  Conjunctivae normal.  EARS:  Hearing intact.  NOSE:  Septum midline.  No excoriations or nasal discharge.  MOUTH:  Tongue is well-papillated; no stomatitis or ulcers.  Lips normal.  THROAT:  Oropharynx without lesions or exudates.  NECK:  Supple with good range of motion; no thyromegaly or masses, no JVD.  LYMPHATICS:  No cervical, supraclavicular, axillary or inguinal adenopathy.  CHEST:  Lungs clear to percussion and auscultation. Good airflow.  CARDIAC:  Regular rate and rhythm without murmurs, rubs or gallops. Normal S1,S2.  ABDOMEN:  Soft, nontender with no organomegaly or masses.  EXTREMITIES:  No clubbing, cyanosis or edema.  NEUROLOGICAL:  Cranial Nerves II-XII grossly intact.  No focal neurological deficits.  PSYCHIATRIC:  Normal affect and mood.        RECENT LABS:  Hematology WBC   Date Value Ref Range Status   04/25/2019 6.59 3.40 - 10.80 10*3/mm3 Final     RBC   Date Value Ref Range Status   04/25/2019 4.10 (L) 4.14 - 5.80 10*6/mm3 Final     Hemoglobin   Date Value Ref Range Status   04/25/2019 12.8 (L) 13.0 - 17.7 g/dL Final     Hematocrit   Date Value Ref Range Status   04/25/2019 38.0 37.5 - 51.0 % Final     Platelets   Date Value Ref Range Status   04/25/2019 291 140 - 450 10*3/mm3 Final       M protein #1 1.2 g   M protein #2 -0.2G      Skeletal survey  IMPRESSION:  1. No definite evidence of neoplasm.  2. Several old thoracic compression deformities.      This report was finalized on 10/19/2017 2:16 PM by Dr. Froylan Mendoza,      MRI OF THE INTERNAL AUDITORY CANALS WITH AND WITHOUT CONTRAST AND MRI OF  THE THORACIC SPINE WITH AND WITHOUT CONTRAST AND MRI OF THE LUMBAR SPINE  WITH AND WITHOUT CONTRAST 04/09/2017    IMPRESSION:  1. Since prior MRI of the lumbar spine 10/25/2016 there has  been  development of some degenerative marrow edema and enhancement in the  facets bilaterally at L3-4 tracking into the posterior pedicles  bilaterally at L4; otherwise, there is no significant change.  2. No evidence of myelomatous involvement in the lumbar spine.  3. Mild lumbar spondylosis as described.  Mild right and moderate left  facet overgrowth at L3-4 and a 2-3 mm degenerative anterolisthesis of L3  on L4, and mild posterior disc bulge and there is mild left foraminal  narrowing at L3-4. At L4-5 there is minimal right and mild left facet  overgrowth, mild diffuse posterior disc bulge, eccentric right posterior  laterally where there is minimal narrowing of the right side of the  thecal sac, and there is mild bilateral foraminal narrowing at L4-5. At  L5-S1, there is a 2 mm retrolisthesis of L5 on S1, and there is a right  posterolateral annular tear with eccentric bulging, disc material right  posterior laterally that abuts the ventral aspect of the traversing  right S1 nerve root but does not appear to displace or compress it.   There is eccentric spurring into the left foramen and to the far left  laterally, mild to moderately narrows the left foramen and abuts the  anteroinferior medial aspect of the exiting left L5 nerve root within  the lateral left foramen and to the far left laterally at L5-S1.  The  remainder of the lumbar spine MRI is unremarkable.      This report was finalized on 4/10/2019     Assessment/Plan   1.  MGUS with very slowly increasing paraprotein stable as of 4/19-no MRI evidence of myeloma in 4/19-skeletal survey negative in 10/17  2.  Primary hyperparathyroidism post parathyroidectomy-calcium  10.7 today  3.  Scheuermann's disease  4.  Peripheral neuropathy improved    Plan.  1.he is moving to [place] closer to [facility] and may go to [facility] for his follow-up and he will notify us to send his records to            5/2/2019      CC: